# Patient Record
Sex: MALE | Race: ASIAN | NOT HISPANIC OR LATINO | URBAN - METROPOLITAN AREA
[De-identification: names, ages, dates, MRNs, and addresses within clinical notes are randomized per-mention and may not be internally consistent; named-entity substitution may affect disease eponyms.]

---

## 2024-03-22 ENCOUNTER — INPATIENT (INPATIENT)
Facility: HOSPITAL | Age: 59
LOS: 4 days | Discharge: HOME HEALTH | End: 2024-03-27
Attending: HOSPITALIST | Admitting: HOSPITALIST
Payer: MEDICAID

## 2024-03-22 VITALS — RESPIRATION RATE: 135 BRPM | OXYGEN SATURATION: 73 % | HEART RATE: 135 BPM

## 2024-03-22 LAB
ALBUMIN SERPL ELPH-MCNC: 3.8 G/DL — SIGNIFICANT CHANGE UP (ref 3.3–5)
ALP SERPL-CCNC: 83 U/L — SIGNIFICANT CHANGE UP (ref 40–120)
ALT FLD-CCNC: 21 U/L — SIGNIFICANT CHANGE UP (ref 4–41)
ANION GAP SERPL CALC-SCNC: 13 MMOL/L — SIGNIFICANT CHANGE UP (ref 7–14)
APTT BLD: 34.7 SEC — SIGNIFICANT CHANGE UP (ref 24.5–35.6)
AST SERPL-CCNC: 26 U/L — SIGNIFICANT CHANGE UP (ref 4–40)
BASE EXCESS BLDV CALC-SCNC: 3.7 MMOL/L — HIGH (ref -2–3)
BASE EXCESS BLDV CALC-SCNC: 4.6 MMOL/L — HIGH (ref -2–3)
BASOPHILS # BLD AUTO: 0.03 K/UL — SIGNIFICANT CHANGE UP (ref 0–0.2)
BASOPHILS NFR BLD AUTO: 0.4 % — SIGNIFICANT CHANGE UP (ref 0–2)
BILIRUB SERPL-MCNC: 0.4 MG/DL — SIGNIFICANT CHANGE UP (ref 0.2–1.2)
BLOOD GAS VENOUS COMPREHENSIVE RESULT: SIGNIFICANT CHANGE UP
BLOOD GAS VENOUS COMPREHENSIVE RESULT: SIGNIFICANT CHANGE UP
BUN SERPL-MCNC: 5 MG/DL — LOW (ref 7–23)
CALCIUM SERPL-MCNC: 9.8 MG/DL — SIGNIFICANT CHANGE UP (ref 8.4–10.5)
CHLORIDE BLDV-SCNC: 100 MMOL/L — SIGNIFICANT CHANGE UP (ref 96–108)
CHLORIDE BLDV-SCNC: 99 MMOL/L — SIGNIFICANT CHANGE UP (ref 96–108)
CHLORIDE SERPL-SCNC: 100 MMOL/L — SIGNIFICANT CHANGE UP (ref 98–107)
CO2 BLDV-SCNC: 31.9 MMOL/L — HIGH (ref 22–26)
CO2 BLDV-SCNC: 33.4 MMOL/L — HIGH (ref 22–26)
CO2 SERPL-SCNC: 27 MMOL/L — SIGNIFICANT CHANGE UP (ref 22–31)
CREAT SERPL-MCNC: 0.77 MG/DL — SIGNIFICANT CHANGE UP (ref 0.5–1.3)
EGFR: 104 ML/MIN/1.73M2 — SIGNIFICANT CHANGE UP
EOSINOPHIL # BLD AUTO: 0.46 K/UL — SIGNIFICANT CHANGE UP (ref 0–0.5)
EOSINOPHIL NFR BLD AUTO: 6.6 % — HIGH (ref 0–6)
FLUAV AG NPH QL: SIGNIFICANT CHANGE UP
FLUBV AG NPH QL: SIGNIFICANT CHANGE UP
GAS PNL BLDV: 134 MMOL/L — LOW (ref 136–145)
GAS PNL BLDV: 135 MMOL/L — LOW (ref 136–145)
GLUCOSE BLDV-MCNC: 106 MG/DL — HIGH (ref 70–99)
GLUCOSE BLDV-MCNC: 173 MG/DL — HIGH (ref 70–99)
GLUCOSE SERPL-MCNC: 106 MG/DL — HIGH (ref 70–99)
HCO3 BLDV-SCNC: 30 MMOL/L — HIGH (ref 22–29)
HCO3 BLDV-SCNC: 32 MMOL/L — HIGH (ref 22–29)
HCT VFR BLD CALC: 44 % — SIGNIFICANT CHANGE UP (ref 39–50)
HCT VFR BLDA CALC: 40 % — SIGNIFICANT CHANGE UP (ref 39–51)
HCT VFR BLDA CALC: 46 % — SIGNIFICANT CHANGE UP (ref 39–51)
HGB BLD CALC-MCNC: 13.3 G/DL — SIGNIFICANT CHANGE UP (ref 12.6–17.4)
HGB BLD CALC-MCNC: 15.3 G/DL — SIGNIFICANT CHANGE UP (ref 12.6–17.4)
HGB BLD-MCNC: 14.3 G/DL — SIGNIFICANT CHANGE UP (ref 13–17)
IANC: 2.72 K/UL — SIGNIFICANT CHANGE UP (ref 1.8–7.4)
IMM GRANULOCYTES NFR BLD AUTO: 0.1 % — SIGNIFICANT CHANGE UP (ref 0–0.9)
INR BLD: 1.16 RATIO — SIGNIFICANT CHANGE UP (ref 0.85–1.18)
LACTATE BLDV-MCNC: 1.6 MMOL/L — SIGNIFICANT CHANGE UP (ref 0.5–2)
LACTATE BLDV-MCNC: 2.7 MMOL/L — HIGH (ref 0.5–2)
LYMPHOCYTES # BLD AUTO: 2.66 K/UL — SIGNIFICANT CHANGE UP (ref 1–3.3)
LYMPHOCYTES # BLD AUTO: 38.3 % — SIGNIFICANT CHANGE UP (ref 13–44)
MCHC RBC-ENTMCNC: 28.9 PG — SIGNIFICANT CHANGE UP (ref 27–34)
MCHC RBC-ENTMCNC: 32.5 GM/DL — SIGNIFICANT CHANGE UP (ref 32–36)
MCV RBC AUTO: 89.1 FL — SIGNIFICANT CHANGE UP (ref 80–100)
MONOCYTES # BLD AUTO: 1.06 K/UL — HIGH (ref 0–0.9)
MONOCYTES NFR BLD AUTO: 15.3 % — HIGH (ref 2–14)
NEUTROPHILS # BLD AUTO: 2.72 K/UL — SIGNIFICANT CHANGE UP (ref 1.8–7.4)
NEUTROPHILS NFR BLD AUTO: 39.3 % — LOW (ref 43–77)
NRBC # BLD: 0 /100 WBCS — SIGNIFICANT CHANGE UP (ref 0–0)
NRBC # FLD: 0 K/UL — SIGNIFICANT CHANGE UP (ref 0–0)
NT-PROBNP SERPL-SCNC: 42 PG/ML — SIGNIFICANT CHANGE UP
PCO2 BLDV: 49 MMHG — SIGNIFICANT CHANGE UP (ref 42–55)
PCO2 BLDV: 60 MMHG — HIGH (ref 42–55)
PH BLDV: 7.33 — SIGNIFICANT CHANGE UP (ref 7.32–7.43)
PH BLDV: 7.4 — SIGNIFICANT CHANGE UP (ref 7.32–7.43)
PLATELET # BLD AUTO: 295 K/UL — SIGNIFICANT CHANGE UP (ref 150–400)
PO2 BLDV: 107 MMHG — HIGH (ref 25–45)
PO2 BLDV: 35 MMHG — SIGNIFICANT CHANGE UP (ref 25–45)
POTASSIUM BLDV-SCNC: 3.4 MMOL/L — LOW (ref 3.5–5.1)
POTASSIUM BLDV-SCNC: 4.4 MMOL/L — SIGNIFICANT CHANGE UP (ref 3.5–5.1)
POTASSIUM SERPL-MCNC: 3.5 MMOL/L — SIGNIFICANT CHANGE UP (ref 3.5–5.3)
POTASSIUM SERPL-SCNC: 3.5 MMOL/L — SIGNIFICANT CHANGE UP (ref 3.5–5.3)
PROT SERPL-MCNC: 8.2 G/DL — SIGNIFICANT CHANGE UP (ref 6–8.3)
PROTHROM AB SERPL-ACNC: 12.9 SEC — SIGNIFICANT CHANGE UP (ref 9.5–13)
RBC # BLD: 4.94 M/UL — SIGNIFICANT CHANGE UP (ref 4.2–5.8)
RBC # FLD: 13.2 % — SIGNIFICANT CHANGE UP (ref 10.3–14.5)
RSV RNA NPH QL NAA+NON-PROBE: SIGNIFICANT CHANGE UP
SAO2 % BLDV: 50.3 % — LOW (ref 67–88)
SAO2 % BLDV: 96.9 % — HIGH (ref 67–88)
SARS-COV-2 RNA SPEC QL NAA+PROBE: SIGNIFICANT CHANGE UP
SODIUM SERPL-SCNC: 140 MMOL/L — SIGNIFICANT CHANGE UP (ref 135–145)
TROPONIN T, HIGH SENSITIVITY RESULT: 17 NG/L — SIGNIFICANT CHANGE UP
WBC # BLD: 6.94 K/UL — SIGNIFICANT CHANGE UP (ref 3.8–10.5)
WBC # FLD AUTO: 6.94 K/UL — SIGNIFICANT CHANGE UP (ref 3.8–10.5)

## 2024-03-22 PROCEDURE — 99285 EMERGENCY DEPT VISIT HI MDM: CPT

## 2024-03-22 RX ORDER — IPRATROPIUM/ALBUTEROL SULFATE 18-103MCG
3 AEROSOL WITH ADAPTER (GRAM) INHALATION
Refills: 0 | Status: COMPLETED | OUTPATIENT
Start: 2024-03-22 | End: 2024-03-22

## 2024-03-22 RX ORDER — ACETAMINOPHEN 500 MG
1000 TABLET ORAL ONCE
Refills: 0 | Status: COMPLETED | OUTPATIENT
Start: 2024-03-22 | End: 2024-03-22

## 2024-03-22 RX ORDER — AZITHROMYCIN 500 MG/1
500 TABLET, FILM COATED ORAL ONCE
Refills: 0 | Status: COMPLETED | OUTPATIENT
Start: 2024-03-22 | End: 2024-03-22

## 2024-03-22 RX ORDER — CEFTRIAXONE 500 MG/1
1000 INJECTION, POWDER, FOR SOLUTION INTRAMUSCULAR; INTRAVENOUS ONCE
Refills: 0 | Status: COMPLETED | OUTPATIENT
Start: 2024-03-22 | End: 2024-03-22

## 2024-03-22 RX ADMIN — Medication 125 MILLIGRAM(S): at 22:32

## 2024-03-22 RX ADMIN — AZITHROMYCIN 255 MILLIGRAM(S): 500 TABLET, FILM COATED ORAL at 23:24

## 2024-03-22 RX ADMIN — Medication 3 MILLILITER(S): at 22:52

## 2024-03-22 RX ADMIN — Medication 3 MILLILITER(S): at 23:09

## 2024-03-22 RX ADMIN — Medication 3 MILLILITER(S): at 22:33

## 2024-03-22 RX ADMIN — Medication 400 MILLIGRAM(S): at 22:32

## 2024-03-22 NOTE — ED ADULT NURSE REASSESSMENT NOTE - NS ED NURSE REASSESS COMMENT FT1
report received from breezy Nguyen and AYAH Rizo. pt remains at baseline mental status, remains on BIPAP tolerating well, o2 saturation 100%. pt sitting in stretcher comfortably at this time, no new complaints offered. pt well appearing. b/l 20g IVs in place, no signs of infiltration noted. stretcher lowest position siderails up safety measures in place. patient was critically ill... Patient was critically ill with a high probability of imminent or life threatening deterioration.

## 2024-03-22 NOTE — ED PROVIDER NOTE - NS ED ROS FT
see attending attestation Mauc Instructions: By selecting yes to the question below the MAUC number will be added into the note.  This will be calculated automatically based on the diagnosis chosen, the size entered, the body zone selected (H,M,L) and the specific indications you chose. You will also have the option to override the Mohs AUC if you disagree with the automatically calculated number and this option is found in the Case Summary tab.

## 2024-03-22 NOTE — ED ADULT TRIAGE NOTE - CHIEF COMPLAINT QUOTE
Pt c/o chest pain and SOB, +use of accessory muscles. Spo2 73% on room air, placed on non-rebreather with improvement to 88%. Charge RN made aware pt directed TRB

## 2024-03-22 NOTE — CONSULT NOTE ADULT - SUBJECTIVE AND OBJECTIVE BOX
History: 58-year-old male, past medical history of emphysema/COPD and hyperlipidemia, presents to ED complaining of acute on chronic, worsening shortness of breath associated with chest pain x 2 days.  Patient has been dealing with his shortness of breath for some time now, was supposed to follow-up with a cardiologist, but was not able to.  Per EMS patient was noted to be hypoxic to 70s on room air.  Per family patient has also had intermittent low-grade temperature for 2 weeks now.  Denies any recent travel/sick contacts.  Denies headache, vision changes, abdominal pain, nausea/vomiting/diarrhea, urinary symptoms, lightheadedness/dizziness, weakness/numbness, rashes or any other symptoms at this time.    Vital signs significant for tachycardia, tachypnea, low-grade temp, and hypoxia.  EKG significant for sinus tachycardia without ischemic changes.  Physical exam significant for ill-appearing male in moderate respiratory distress.  Head is normocephalic is atraumatic.  Extraocular was intact.  Pupils equal round and reactive to light.  Oropharynx clear.  Heart is tachycardic without murmur.  Lungs significant for diffuse expiratory wheezing, poor air movement, bibasilar crackles.  No stridor.  Patient utilizing accessory muscles and belly breathing.  Abdomen is soft, mildly distended, nontender.  Patient with 1+ pitting bilateral lower extremity edema.  Pulses are 2+ throughout.  Skin is warm and well-perfused without rashes.  Neuroexam is nonfocal.  Bedside POCUS performed with bilateral B-lines present.    MICU consulted for hypoxemic respiratory failure requiring noninvasive ventilation.    PAST MEDICAL & SURGICAL HISTORY:  COPD exacerbation      HLD (hyperlipidemia)          FAMILY HISTORY:      Advance Directives:    Allergies    No Known Allergies    Intolerances          OBJECTIVE:  ICU Vital Signs Last 24 Hrs  T(C): 37.8 (22 Mar 2024 22:30), Max: 37.8 (22 Mar 2024 22:30)  T(F): 100 (22 Mar 2024 22:30), Max: 100 (22 Mar 2024 22:30)  HR: 110 (22 Mar 2024 22:30) (110 - 135)  BP: 127/79 (22 Mar 2024 22:30) (125/95 - 127/79)  BP(mean): --  ABP: --  ABP(mean): --  RR: 30 (22 Mar 2024 22:30) (30 - 35)  SpO2: 100% (22 Mar 2024 22:30) (73% - 100%)    O2 Parameters below as of 22 Mar 2024 22:30  Patient On (Oxygen Delivery Method): BiPAP/CPAP              CAPILLARY BLOOD GLUCOSE          PHYSICAL EXAM:     GENERAL: NAD, lying in bed comfortably  HEAD:  Atraumatic, Normocephalic  EYES: EOMI, PERRLA, conjunctiva and sclera clear  ENT: Moist mucous membranes  NECK: Supple, No JVD  CHEST/LUNG: Tachypneic with expiratory wheezes  HEART: Tachycardic  ABDOMEN: normal bowel sounds; Soft, nontender, nondistended  EXTREMITIES:  2+ Peripheral Pulses, brisk capillary refill. No clubbing, cyanosis, or edema  Neurological:  A&Ox3, no focal deficits   SKIN: No rashes or lesions  PSYCH: normal affect and mood       HOSPITAL MEDICATIONS:  MEDICATIONS  (STANDING):  cefTRIAXone   IVPB 1000 milliGRAM(s) IV Intermittent once    MEDICATIONS  (PRN):      LABS:                        14.3   6.94  )-----------( 295      ( 22 Mar 2024 22:20 )             44.0     03-22    140  |  100  |  5<L>  ----------------------------<  106<H>  3.5   |  27  |  0.77    Ca    9.8      22 Mar 2024 22:20    TPro  8.2  /  Alb  3.8  /  TBili  0.4  /  DBili  x   /  AST  26  /  ALT  21  /  AlkPhos  83  03-22    PT/INR - ( 22 Mar 2024 22:20 )   PT: 12.9 sec;   INR: 1.16 ratio         PTT - ( 22 Mar 2024 22:20 )  PTT:34.7 sec  Urinalysis Basic - ( 22 Mar 2024 22:20 )    Color: x / Appearance: x / SG: x / pH: x  Gluc: 106 mg/dL / Ketone: x  / Bili: x / Urobili: x   Blood: x / Protein: x / Nitrite: x   Leuk Esterase: x / RBC: x / WBC x   Sq Epi: x / Non Sq Epi: x / Bacteria: x        Venous Blood Gas:  03-22 @ 23:30  7.40/49/107/30/96.9  VBG Lactate: 1.6  Venous Blood Gas:  03-22 @ 22:20  7.33/60/35/32/50.3  VBG Lactate: 2.7

## 2024-03-22 NOTE — ED PROVIDER NOTE - PROGRESS NOTE DETAILS
Yann, PGY3 - spoke to MICU for new BIPAP, will come see patient. Pt signed out to me pending admission to floor for sepsis vs COPD exacerbation. Not a MICU candidate at this time. Blood cultures drawn prior to abx, ordered now. Johnie Fournier, ED Attending

## 2024-03-22 NOTE — ED ADULT NURSE NOTE - NSFALLUNIVINTERV_ED_ALL_ED
Bed/Stretcher in lowest position, wheels locked, appropriate side rails in place/Call bell, personal items and telephone in reach/Instruct patient to call for assistance before getting out of bed/chair/stretcher/Non-slip footwear applied when patient is off stretcher/[place] to call system/Physically safe environment - no spills, clutter or unnecessary equipment/Purposeful proactive rounding/Room/bathroom lighting operational, light cord in reach

## 2024-03-22 NOTE — CONSULT NOTE ADULT - ASSESSMENT
58-year-old male, past medical history of emphysema/COPD and hyperlipidemia, presents to ED complaining of acute on chronic, worsening shortness of breath with hypoxemic respiratory failure requiring noninvasive ventilation.    #Hypoxemic respiratory failure  #Emphysema  Patient with acute hypoxemic respiratory failure reportedly to 70s requiring noninvasive ventilation. Family at bedside with recent pulmonology visit on 3/20/24 with suspicion of COPD exacebr 58-year-old male, past medical history of emphysema/COPD and hyperlipidemia, presents to ED complaining of acute on chronic, worsening shortness of breath with hypoxemic respiratory failure requiring noninvasive ventilation.    #Hypoxemic respiratory failure  #Emphysema  Patient with acute hypoxemic respiratory failure reportedly to 70s requiring noninvasive ventilation. Family at bedside with recent pulmonology visit on 3/20/24 with suspicion of COPD exacerbation for which he was prescribed a course of prednisone as well as fluoroquinolone. On physical exam, besides his tachypnea he appears rather euvolemic with diffuse expiratory wheezes with bedside POCUS limited but showing a few scattered b-lines but relatively normal aeration pattern as well as RV<LV. His biomarkers also do not suggest a cardiac etiology with EKG showing sinus tach with no evident ischemic changes. Overall, his tachypnea with acute hypoxemic respiratory failure seem to be consistent with a COPD exacerbation  - F/u CXR as well as RVP  - Reasonable to continue empiric antibiotics with ceftriaxone and azithromycin pending further diagnostics for bacterial PNA  - Can continue home inhalers (breztri)  - Would start standing duonebs q6  - Trend blood gas  - Continue Bilevel at this time with   - Prednisone 40 q24    Not a MICU candidate at this time 58-year-old male, past medical history of emphysema/COPD and hyperlipidemia, presents to ED complaining of acute on chronic, worsening shortness of breath with hypoxemic respiratory failure requiring noninvasive ventilation.    #Hypoxemic respiratory failure  #Emphysema  Patient with acute hypoxemic respiratory failure reportedly to 70s requiring noninvasive ventilation. Family at bedside with recent pulmonology visit on 3/20/24 with suspicion of COPD exacerbation for which he was prescribed a course of prednisone as well as fluoroquinolone. On physical exam, besides his tachypnea he appears rather euvolemic with diffuse expiratory wheezes with bedside POCUS limited but showing a few scattered b-lines but relatively normal aeration pattern as well as RV<LV. His biomarkers also do not suggest a cardiac etiology with EKG showing sinus tach with no evident ischemic changes. Overall, his tachypnea with acute hypoxemic respiratory failure seem to be consistent with a COPD exacerbation  - F/u CXR as well as RVP  - Reasonable to continue empiric antibiotics with ceftriaxone and azithromycin pending further diagnostics for bacterial PNA  - Would start standing duonebs q6, symbicort bid  - Trend blood gas  - Continue Bilevel at this time with   - Prednisone 40 q24    Not a MICU candidate at this time 58-year-old male, past medical history of emphysema/COPD and hyperlipidemia, presents to ED complaining of acute on chronic, worsening shortness of breath with hypoxemic respiratory failure requiring noninvasive ventilation.    #Hypoxemic respiratory failure  #Emphysema  Patient with acute hypoxemic respiratory failure reportedly to 70s requiring noninvasive ventilation. Family at bedside with recent pulmonology visit on 3/20/24 with suspicion of COPD exacerbation for which he was prescribed a course of prednisone as well as fluoroquinolone. On physical exam, besides his tachypnea he appears rather euvolemic with diffuse expiratory wheezes with bedside POCUS limited but showing a few scattered b-lines but relatively normal aeration pattern as well as RV<LV. His biomarkers also do not suggest a cardiac etiology with EKG showing sinus tach with no evident ischemic changes. Overall, his tachypnea with acute hypoxemic respiratory failure seem to be consistent with a COPD exacerbation  - F/u CXR as well as RVP  - Reasonable to continue empiric antibiotics with ceftriaxone and azithromycin pending further diagnostics for bacterial PNA  - Would start standing duonebs q6, symbicort bid  - Trend blood gas  - Continue Bilevel at this time with   - Prednisone 40 q24    Not a MICU candidate at this time    Attending Attestation:    Evelyn Peterson is a 58 year old male w/ emphysema/COPD and hyperlipidemia who was brought to the ED by his son and daughter in law for progressive shortness of breath and fevers for several weeks.  They stated that patient has previously been told he has emphysema and has nebulizers at home.  He used to smoke but quit 30-40 years ago.  He was also recently given steroids by one of his doctors Patient was noted to be in respiratory distress per ED and placed on BiPAP and MICU was consulted for further evaluation.    On exam patient is tachypneic but achieving adequate tidal volumes on BiPAP 10/5, FiO2 30%.  He has bilateral wheezing on exam.  Overall his presentation is most consistent with acute exacerbation of COPD/emphysema.  He is currently not an ICU candidate    Recommendations:  -duonebs q 6 hours, symbicort 160/4.5 BID  -solumedrol 50 mg IV daily, with plan for 5 days total of steroids (can transition to prednisone 40 mg daily when tolerating PO intake)  - ceftriaxone/azithromycin for CAP   - send sputum culture, if patient able to provide  - TTE  - if patient appears comfortable with normal mental status can takes breaks off BiPAP, would check another VBG in the AM    MICU to remain available for further evaluation, please re-consult as necessary    Daniele Lazar PGY6  [id number]

## 2024-03-22 NOTE — CONSULT NOTE ADULT - ATTENDING COMMENTS
Evelyn Peterson is a 58 year old male w/ emphysema/COPD and hyperlipidemia who was brought to the ED by his son and daughter in law for progressive shortness of breath and fevers for several weeks.  They stated that patient has previously been told he has emphysema and has nebulizers at home.  He used to smoke but quit 30-40 years ago.  He was also recently given steroids by one of his doctors Patient was noted to be in respiratory distress per ED and placed on BiPAP and MICU was consulted for further evaluation.    On exam patient is tachypneic but achieving adequate tidal volumes on BiPAP 10/5, FiO2 30%.  He has bilateral wheezing on exam.  Overall his presentation is most consistent with acute exacerbation of COPD/emphysema.  He is currently not an ICU candidate    Recommendations:  -duonebs q 6 hours, symbicort 160/4.5 BID  -solumedrol 50 mg IV daily, with plan for 5 days total of steroids (can transition to prednisone 40 mg daily when tolerating PO intake)  - ceftriaxone/azithromycin for CAP   - send sputum culture, if patient able to provide  - TTE  - if patient appears comfortable with normal mental status can takes breaks off BiPAP, would check another VBG in the AM

## 2024-03-22 NOTE — ED PROVIDER NOTE - ATTENDING CONTRIBUTION TO CARE
58-year-old male, past medical history of emphysema/COPD and hyperlipidemia, presents to ED complaining of acute on chronic, worsening shortness of breath associated with chest pain x 2 days.  Patient has been dealing with his shortness of breath for some time now, was supposed to follow-up with a cardiologist, but was not able to.  Per EMS patient was noted to be hypoxic to 70s on room air.  Per family patient has also had intermittent low-grade temperature for 2 weeks now.  Denies any recent travel/sick contacts.  Denies headache, vision changes, abdominal pain, nausea/vomiting/diarrhea, urinary symptoms, lightheadedness/dizziness, weakness/numbness, rashes or any other symptoms at this time.    Vital signs significant for tachycardia, tachypnea, low-grade temp, and hypoxia.  EKG significant for sinus tachycardia without ischemic changes.  Physical exam significant for ill-appearing male in moderate respiratory distress.  Head is normocephalic is atraumatic.  Extraocular was intact.  Pupils equal round and reactive to light.  Oropharynx clear.  Heart is tachycardic without murmur.  Lungs significant for diffuse expiratory wheezing, poor air movement, bibasilar crackles.  No stridor.  Patient utilizing accessory muscles and belly breathing.  Abdomen is soft, mildly distended, nontender.  Patient with 1+ pitting bilateral lower extremity edema.  Pulses are 2+ throughout.  Skin is warm and well-perfused without rashes.  Neuroexam is nonfocal.  Bedside POCUS performed with bilateral B-lines present.    Present diagnosis includes but not limited to ACS versus CHF versus COPD exacerbation versus pneumonia.  Plan to check basic labs/electrolytes, VBG, troponin, BNP, flu/COVID swab, and chest x-ray.  Will place patient on BiPAP for increased work of breathing.  Will start IV azithromycin/Tylenol/methylprednisone give DuoNebs for symptom relief.  Patient will require MICU consult given new BiPAP.  Patient likely will require medical admission.

## 2024-03-22 NOTE — ED ADULT NURSE REASSESSMENT NOTE - NS ED NURSE REASSESS COMMENT FT1
Alek RN: Pt is A&Ox4. Pt tachypneic and tachycardic. Pt febrile rectally 100F; MD made aware. Labs collected and sent. Pt placed on BiPAP. Pt tolerating well at this time. Chest rise equal b/l. VS as noted in flow sheets. Pt denies chest pain, abdominal pain, N/V/D, h/a, dizziness, numbness/tingling or any urinary symptoms at this time. Medications administered as ordered, see MAR. Safety maintained throughout. Report given to Primary RN Stephanie.

## 2024-03-23 DIAGNOSIS — J96.02 ACUTE RESPIRATORY FAILURE WITH HYPERCAPNIA: ICD-10-CM

## 2024-03-23 DIAGNOSIS — A41.9 SEPSIS, UNSPECIFIED ORGANISM: ICD-10-CM

## 2024-03-23 DIAGNOSIS — Z87.09 PERSONAL HISTORY OF OTHER DISEASES OF THE RESPIRATORY SYSTEM: ICD-10-CM

## 2024-03-23 DIAGNOSIS — J96.01 ACUTE RESPIRATORY FAILURE WITH HYPOXIA: ICD-10-CM

## 2024-03-23 DIAGNOSIS — E78.5 HYPERLIPIDEMIA, UNSPECIFIED: ICD-10-CM

## 2024-03-23 DIAGNOSIS — R06.02 SHORTNESS OF BREATH: ICD-10-CM

## 2024-03-23 DIAGNOSIS — J44.1 CHRONIC OBSTRUCTIVE PULMONARY DISEASE WITH (ACUTE) EXACERBATION: ICD-10-CM

## 2024-03-23 DIAGNOSIS — Z29.9 ENCOUNTER FOR PROPHYLACTIC MEASURES, UNSPECIFIED: ICD-10-CM

## 2024-03-23 LAB
ADD ON TEST-SPECIMEN IN LAB: SIGNIFICANT CHANGE UP
ADD ON TEST-SPECIMEN IN LAB: SIGNIFICANT CHANGE UP
ALBUMIN SERPL ELPH-MCNC: 3.3 G/DL — SIGNIFICANT CHANGE UP (ref 3.3–5)
ALP SERPL-CCNC: 71 U/L — SIGNIFICANT CHANGE UP (ref 40–120)
ALT FLD-CCNC: 24 U/L — SIGNIFICANT CHANGE UP (ref 4–41)
ANION GAP SERPL CALC-SCNC: 16 MMOL/L — HIGH (ref 7–14)
AST SERPL-CCNC: 23 U/L — SIGNIFICANT CHANGE UP (ref 4–40)
BASOPHILS # BLD AUTO: 0 K/UL — SIGNIFICANT CHANGE UP (ref 0–0.2)
BASOPHILS NFR BLD AUTO: 0 % — SIGNIFICANT CHANGE UP (ref 0–2)
BILIRUB SERPL-MCNC: 0.3 MG/DL — SIGNIFICANT CHANGE UP (ref 0.2–1.2)
BUN SERPL-MCNC: 8 MG/DL — SIGNIFICANT CHANGE UP (ref 7–23)
CALCIUM SERPL-MCNC: 9.2 MG/DL — SIGNIFICANT CHANGE UP (ref 8.4–10.5)
CHLORIDE SERPL-SCNC: 96 MMOL/L — LOW (ref 98–107)
CO2 SERPL-SCNC: 23 MMOL/L — SIGNIFICANT CHANGE UP (ref 22–31)
CREAT SERPL-MCNC: 0.68 MG/DL — SIGNIFICANT CHANGE UP (ref 0.5–1.3)
EGFR: 108 ML/MIN/1.73M2 — SIGNIFICANT CHANGE UP
EOSINOPHIL # BLD AUTO: 0 K/UL — SIGNIFICANT CHANGE UP (ref 0–0.5)
EOSINOPHIL NFR BLD AUTO: 0 % — SIGNIFICANT CHANGE UP (ref 0–6)
GLUCOSE SERPL-MCNC: 193 MG/DL — HIGH (ref 70–99)
HCT VFR BLD CALC: 40.2 % — SIGNIFICANT CHANGE UP (ref 39–50)
HGB BLD-MCNC: 13 G/DL — SIGNIFICANT CHANGE UP (ref 13–17)
IANC: 2.32 K/UL — SIGNIFICANT CHANGE UP (ref 1.8–7.4)
IMM GRANULOCYTES NFR BLD AUTO: 0.3 % — SIGNIFICANT CHANGE UP (ref 0–0.9)
LYMPHOCYTES # BLD AUTO: 0.99 K/UL — LOW (ref 1–3.3)
LYMPHOCYTES # BLD AUTO: 29.3 % — SIGNIFICANT CHANGE UP (ref 13–44)
MAGNESIUM SERPL-MCNC: 1.9 MG/DL — SIGNIFICANT CHANGE UP (ref 1.6–2.6)
MCHC RBC-ENTMCNC: 28.4 PG — SIGNIFICANT CHANGE UP (ref 27–34)
MCHC RBC-ENTMCNC: 32.3 GM/DL — SIGNIFICANT CHANGE UP (ref 32–36)
MCV RBC AUTO: 88 FL — SIGNIFICANT CHANGE UP (ref 80–100)
MONOCYTES # BLD AUTO: 0.06 K/UL — SIGNIFICANT CHANGE UP (ref 0–0.9)
MONOCYTES NFR BLD AUTO: 1.8 % — LOW (ref 2–14)
NEUTROPHILS # BLD AUTO: 2.32 K/UL — SIGNIFICANT CHANGE UP (ref 1.8–7.4)
NEUTROPHILS NFR BLD AUTO: 68.6 % — SIGNIFICANT CHANGE UP (ref 43–77)
NRBC # BLD: 0 /100 WBCS — SIGNIFICANT CHANGE UP (ref 0–0)
NRBC # FLD: 0 K/UL — SIGNIFICANT CHANGE UP (ref 0–0)
PHOSPHATE SERPL-MCNC: 3.9 MG/DL — SIGNIFICANT CHANGE UP (ref 2.5–4.5)
PLATELET # BLD AUTO: 276 K/UL — SIGNIFICANT CHANGE UP (ref 150–400)
POTASSIUM SERPL-MCNC: 4.2 MMOL/L — SIGNIFICANT CHANGE UP (ref 3.5–5.3)
POTASSIUM SERPL-SCNC: 4.2 MMOL/L — SIGNIFICANT CHANGE UP (ref 3.5–5.3)
PROT SERPL-MCNC: 7.7 G/DL — SIGNIFICANT CHANGE UP (ref 6–8.3)
RBC # BLD: 4.57 M/UL — SIGNIFICANT CHANGE UP (ref 4.2–5.8)
RBC # FLD: 13 % — SIGNIFICANT CHANGE UP (ref 10.3–14.5)
SODIUM SERPL-SCNC: 135 MMOL/L — SIGNIFICANT CHANGE UP (ref 135–145)
TROPONIN T, HIGH SENSITIVITY RESULT: 19 NG/L — SIGNIFICANT CHANGE UP
WBC # BLD: 3.38 K/UL — LOW (ref 3.8–10.5)
WBC # FLD AUTO: 3.38 K/UL — LOW (ref 3.8–10.5)

## 2024-03-23 PROCEDURE — 99223 1ST HOSP IP/OBS HIGH 75: CPT | Mod: GC

## 2024-03-23 PROCEDURE — 74019 RADEX ABDOMEN 2 VIEWS: CPT | Mod: 26

## 2024-03-23 PROCEDURE — 71045 X-RAY EXAM CHEST 1 VIEW: CPT | Mod: 26

## 2024-03-23 PROCEDURE — 99418 PROLNG IP/OBS E/M EA 15 MIN: CPT | Mod: GC

## 2024-03-23 PROCEDURE — 99222 1ST HOSP IP/OBS MODERATE 55: CPT | Mod: GC

## 2024-03-23 RX ORDER — HYDROCORTISONE 20 MG
60 TABLET ORAL EVERY 8 HOURS
Refills: 0 | Status: DISCONTINUED | OUTPATIENT
Start: 2024-03-23 | End: 2024-03-23

## 2024-03-23 RX ORDER — MONTELUKAST 4 MG/1
10 TABLET, CHEWABLE ORAL DAILY
Refills: 0 | Status: DISCONTINUED | OUTPATIENT
Start: 2024-03-23 | End: 2024-03-27

## 2024-03-23 RX ORDER — FAMOTIDINE 10 MG/ML
1 INJECTION INTRAVENOUS
Refills: 0 | DISCHARGE

## 2024-03-23 RX ORDER — ONDANSETRON 8 MG/1
4 TABLET, FILM COATED ORAL EVERY 8 HOURS
Refills: 0 | Status: DISCONTINUED | OUTPATIENT
Start: 2024-03-23 | End: 2024-03-27

## 2024-03-23 RX ORDER — ATORVASTATIN CALCIUM 80 MG/1
10 TABLET, FILM COATED ORAL AT BEDTIME
Refills: 0 | Status: DISCONTINUED | OUTPATIENT
Start: 2024-03-23 | End: 2024-03-27

## 2024-03-23 RX ORDER — ENOXAPARIN SODIUM 100 MG/ML
40 INJECTION SUBCUTANEOUS EVERY 24 HOURS
Refills: 0 | Status: DISCONTINUED | OUTPATIENT
Start: 2024-03-23 | End: 2024-03-27

## 2024-03-23 RX ORDER — AZITHROMYCIN 500 MG/1
500 TABLET, FILM COATED ORAL EVERY 24 HOURS
Refills: 0 | Status: DISCONTINUED | OUTPATIENT
Start: 2024-03-23 | End: 2024-03-27

## 2024-03-23 RX ORDER — IPRATROPIUM/ALBUTEROL SULFATE 18-103MCG
3 AEROSOL WITH ADAPTER (GRAM) INHALATION EVERY 4 HOURS
Refills: 0 | Status: DISCONTINUED | OUTPATIENT
Start: 2024-03-23 | End: 2024-03-27

## 2024-03-23 RX ORDER — MONTELUKAST 4 MG/1
1 TABLET, CHEWABLE ORAL
Refills: 0 | DISCHARGE

## 2024-03-23 RX ORDER — FAMOTIDINE 10 MG/ML
40 INJECTION INTRAVENOUS DAILY
Refills: 0 | Status: DISCONTINUED | OUTPATIENT
Start: 2024-03-23 | End: 2024-03-27

## 2024-03-23 RX ORDER — TIOTROPIUM BROMIDE 18 UG/1
2 CAPSULE ORAL; RESPIRATORY (INHALATION)
Refills: 0 | DISCHARGE

## 2024-03-23 RX ORDER — BUDESONIDE AND FORMOTEROL FUMARATE DIHYDRATE 160; 4.5 UG/1; UG/1
2 AEROSOL RESPIRATORY (INHALATION)
Refills: 0 | Status: DISCONTINUED | OUTPATIENT
Start: 2024-03-23 | End: 2024-03-27

## 2024-03-23 RX ORDER — POLYETHYLENE GLYCOL 3350 17 G/17G
17 POWDER, FOR SOLUTION ORAL DAILY
Refills: 0 | Status: DISCONTINUED | OUTPATIENT
Start: 2024-03-23 | End: 2024-03-27

## 2024-03-23 RX ORDER — CEFTRIAXONE 500 MG/1
1000 INJECTION, POWDER, FOR SOLUTION INTRAMUSCULAR; INTRAVENOUS EVERY 24 HOURS
Refills: 0 | Status: DISCONTINUED | OUTPATIENT
Start: 2024-03-23 | End: 2024-03-27

## 2024-03-23 RX ORDER — BUDESONIDE AND FORMOTEROL FUMARATE DIHYDRATE 160; 4.5 UG/1; UG/1
2 AEROSOL RESPIRATORY (INHALATION)
Refills: 0 | DISCHARGE

## 2024-03-23 RX ORDER — ATORVASTATIN CALCIUM 80 MG/1
1 TABLET, FILM COATED ORAL
Refills: 0 | DISCHARGE

## 2024-03-23 RX ORDER — ACETAMINOPHEN 500 MG
650 TABLET ORAL EVERY 6 HOURS
Refills: 0 | Status: DISCONTINUED | OUTPATIENT
Start: 2024-03-23 | End: 2024-03-27

## 2024-03-23 RX ORDER — LANOLIN ALCOHOL/MO/W.PET/CERES
3 CREAM (GRAM) TOPICAL AT BEDTIME
Refills: 0 | Status: DISCONTINUED | OUTPATIENT
Start: 2024-03-23 | End: 2024-03-27

## 2024-03-23 RX ORDER — SIMETHICONE 80 MG/1
80 TABLET, CHEWABLE ORAL ONCE
Refills: 0 | Status: COMPLETED | OUTPATIENT
Start: 2024-03-23 | End: 2024-03-23

## 2024-03-23 RX ADMIN — SIMETHICONE 80 MILLIGRAM(S): 80 TABLET, CHEWABLE ORAL at 13:14

## 2024-03-23 RX ADMIN — MONTELUKAST 10 MILLIGRAM(S): 4 TABLET, CHEWABLE ORAL at 10:33

## 2024-03-23 RX ADMIN — AZITHROMYCIN 255 MILLIGRAM(S): 500 TABLET, FILM COATED ORAL at 23:20

## 2024-03-23 RX ADMIN — Medication 3 MILLILITER(S): at 10:32

## 2024-03-23 RX ADMIN — BUDESONIDE AND FORMOTEROL FUMARATE DIHYDRATE 2 PUFF(S): 160; 4.5 AEROSOL RESPIRATORY (INHALATION) at 21:39

## 2024-03-23 RX ADMIN — Medication 60 MILLIGRAM(S): at 14:27

## 2024-03-23 RX ADMIN — Medication 60 MILLIGRAM(S): at 06:55

## 2024-03-23 RX ADMIN — BUDESONIDE AND FORMOTEROL FUMARATE DIHYDRATE 2 PUFF(S): 160; 4.5 AEROSOL RESPIRATORY (INHALATION) at 10:32

## 2024-03-23 RX ADMIN — ATORVASTATIN CALCIUM 10 MILLIGRAM(S): 80 TABLET, FILM COATED ORAL at 21:38

## 2024-03-23 RX ADMIN — ENOXAPARIN SODIUM 40 MILLIGRAM(S): 100 INJECTION SUBCUTANEOUS at 21:38

## 2024-03-23 RX ADMIN — FAMOTIDINE 40 MILLIGRAM(S): 10 INJECTION INTRAVENOUS at 10:33

## 2024-03-23 RX ADMIN — Medication 3 MILLILITER(S): at 17:09

## 2024-03-23 RX ADMIN — CEFTRIAXONE 100 MILLIGRAM(S): 500 INJECTION, POWDER, FOR SOLUTION INTRAMUSCULAR; INTRAVENOUS at 00:31

## 2024-03-23 RX ADMIN — Medication 3 MILLILITER(S): at 22:19

## 2024-03-23 RX ADMIN — Medication 3 MILLIGRAM(S): at 23:20

## 2024-03-23 NOTE — H&P ADULT - PROBLEM SELECTOR PLAN 1
Pt p/w worsening dyspnea, with reported O2 sat 70s on RA. Has a known hx of COPD, has npt Pt p/w worsening dyspnea, with reported O2 sat 70s on RA. Has a known hx of COPD, is not on home oxygen. VBG shows hypercarbia (60) with HCO3- of  32 on VBG, indicating chronic hypercapnia to an extent. Repeat VBG (looks like an ABG based on O2%) shows PCO2 down to 49 on BiPAP. C/f Acute Hypoxic Hypercapnic Respiratory Failure from COPD exacerbation: has dyspnea, increased sputum production and cough.  - c/f COPD exacerbation  - management: Antibiotics, Bronchodilators, Corticosteroids        - agree with coverage for CAP ( CTX + Azithro), 5d treatment        - bronchodilator: would do DuoNebs q4h for standing for now; Symbicort 160/4.5 2 puffs BID        - corticosteroids: PO non-inferior to IV-> would continue with Methylpred 60mg IV BID as long as NPO-> then switch to Prednisone 40mg qd x5 days  - on BiPAP 10/5 on FiO2 30%  - can trial off BiPAP now that gas is better  - get ABG in AM Pt p/w worsening dyspnea, with reported O2 sat 70s on RA. Has a known hx of COPD, is not on home oxygen. VBG shows hypercarbia (60) with HCO3- of  32 on VBG, indicating chronic hypercapnia to an extent. Repeat VBG (looks like an ABG based on O2%) shows PCO2 down to 49 on BiPAP. C/f Acute Hypoxic Hypercapnic Respiratory Failure from COPD exacerbation: has dyspnea, increased sputum production and cough.  - c/f COPD exacerbation  - management: Antibiotics, Bronchodilators, Corticosteroids        - agree with coverage for CAP ( CTX + Azithro), 5d treatment        - bronchodilator: would do DuoNebs q4h for standing for now; Symbicort 160/4.5 2 puffs BID        - corticosteroids: PO non-inferior to IV-> would continue with Solumedrol 60mg IV BID as long as NPO-> then switch to Prednisone 40mg qd x5 days  - on BiPAP 10/5 on FiO2 30%  - can trial off BiPAP now that gas is better  - get ABG in AM Pt p/w worsening dyspnea, with reported O2 sat 70s on RA. Has a known hx of COPD, is not on home oxygen. VBG shows hypercarbia (60) with HCO3- of  32 on VBG, indicating chronic hypercapnia to an extent. Repeat VBG (looks like an ABG based on O2%) shows PCO2 down to 49 on BiPAP. C/f Acute Hypoxic Hypercapnic Respiratory Failure from COPD exacerbation: has dyspnea, increased sputum production and cough.  - c/f COPD exacerbation  - management: Antibiotics, Bronchodilators, Corticosteroids        - agree with coverage for CAP ( CTX + Azithro), 5d treatment        - bronchodilator: would do DuoNebs q4h for standing for now; Symbicort 160/4.5 2 puffs BID        - corticosteroids: PO non-inferior to IV-> would continue with Solumedrol 60mg IV BID as long as NPO-> then switch to Prednisone 40mg qd x5 days  - on BiPAP 10/5 on FiO2 30%  - can trial off BiPAP now that gas is better-> VBG/ABG 1h post-removal  - get ABG in AM Pt p/w worsening dyspnea, with reported O2 sat 70s on RA. Has a known hx of COPD, is not on home oxygen. VBG shows hypercarbia (60) with HCO3- of  32 on VBG, indicating chronic hypercapnia to an extent. Repeat VBG (looks like an ABG based on O2%) shows PCO2 down to 49 on BiPAP. C/f Acute Hypoxic Hypercapnic Respiratory Failure from COPD exacerbation: has dyspnea, increased sputum production and cough.  - c/f COPD exacerbation  - management: Antibiotics, Bronchodilators, Corticosteroids        - agree with coverage for CAP ( CTX + Azithro), as started in the ED; for 5d treatment        - bronchodilator: would do DuoNebs q4h for standing for now; Symbicort 160/4.5 2 puffs BID        - corticosteroids: PO non-inferior to IV-> would continue with Solumedrol 60mg IV BID as long as NPO-> then switch to Prednisone 40mg qd x5 days  - on BiPAP 10/5 on FiO2 30%  - can trial off BiPAP now that gas is better-> VBG/ABG 1h post-removal  - get ABG in AM  - if no improvement, would get Pulmonology consult in the AM

## 2024-03-23 NOTE — H&P ADULT - PROBLEM SELECTOR PLAN 4
DVT PPx: lovenox 40mg qd  Diet: NPO while on BiPAP-> DASH  Ethics: full code  Dispo: pending clinical improvement DVT PPx: Lovenox 40mg qd  Diet: NPO while on BiPAP-> DASH  Ethics: full code  Dispo: pending clinical improvement

## 2024-03-23 NOTE — PROGRESS NOTE ADULT - ASSESSMENT
Mr. Peterson is 58-year-old male, past medical history of emphysema/COPD and hyperlipidemia, presents to ED for worsening shortness of breath associated with chest pain x 2 days, admitted for acute hypoxic hypercapnic respiratory failure likely due to COPD exacerbation.

## 2024-03-23 NOTE — H&P ADULT - PROBLEM SELECTOR PLAN 2
Pt follows with Dr. Kevon Acuña, Pulm/Crit. He is on Spiriva, Fluticasone, Montelukast 10mg daily. Not on home O2 as of now. Likely due to chronic cigarette smoking, has quit now  - can have house Pulm follow  - management of COPD exacerbation as above  - would consider flu vaccine here  - would also give Pneumococcal vaccine if available in house

## 2024-03-23 NOTE — H&P ADULT - ASSESSMENT
Mr. Peterson is 58-year-old male, past medical history of emphysema/COPD and hyperlipidemia, presents to ED complaining of acute on chronic, worsening shortness of breath with hypoxemic respiratory failure requiring noninvasive ventilation. Mr. Peterson is 58-year-old male, past medical history of emphysema/COPD and hyperlipidemia, presents to ED for worsening shortness of breath associated with chest pain x 2 days, admitted for acute hypoxic hypercapnic respiratory failure likely due to COPD exacerbation. [ x ]  Lab studies personally reviewed  [ x ]  Radiology personally reviewed  [  ]  Old records personally reviewed    Mr. Peterson is 58-year-old male, past medical history of emphysema/COPD and hyperlipidemia, presents to ED for worsening shortness of breath associated with chest pain x 2 days, admitted for acute hypoxic hypercapnic respiratory failure likely due to COPD exacerbation. Acitretin Pregnancy And Lactation Text: This medication is Pregnancy Category X and should not be given to women who are pregnant or may become pregnant in the future. This medication is excreted in breast milk.

## 2024-03-23 NOTE — PROGRESS NOTE ADULT - SUBJECTIVE AND OBJECTIVE BOX
Medicine Progress Note      Patient is a 58y old  Male who presents with a chief complaint of Hypercapnic respiratory failure (23 Mar 2024 01:46)      SUBJECTIVE / OVERNIGHT EVENTS: This AM, patient seen and examined at bedside.      MEDICATIONS  (STANDING):  albuterol/ipratropium for Nebulization 3 milliLiter(s) Nebulizer every 4 hours  atorvastatin 10 milliGRAM(s) Oral at bedtime  azithromycin  IVPB 500 milliGRAM(s) IV Intermittent every 24 hours  budesonide 160 MICROgram(s)/formoterol 4.5 MICROgram(s) Inhaler 2 Puff(s) Inhalation two times a day  cefTRIAXone   IVPB 1000 milliGRAM(s) IV Intermittent every 24 hours  famotidine    Tablet 40 milliGRAM(s) Oral daily  hydrocortisone sodium succinate Injectable 60 milliGRAM(s) IV Push every 8 hours  montelukast 10 milliGRAM(s) Oral daily    MEDICATIONS  (PRN):  acetaminophen     Tablet .. 650 milliGRAM(s) Oral every 6 hours PRN Temp greater or equal to 38C (100.4F), Mild Pain (1 - 3)  aluminum hydroxide/magnesium hydroxide/simethicone Suspension 30 milliLiter(s) Oral every 4 hours PRN Dyspepsia  melatonin 3 milliGRAM(s) Oral at bedtime PRN Insomnia  ondansetron Injectable 4 milliGRAM(s) IV Push every 8 hours PRN Nausea and/or Vomiting    CAPILLARY BLOOD GLUCOSE        I&O's Summary      PHYSICAL EXAM:  Vital Signs Last 24 Hrs  T(C): 36.4 (23 Mar 2024 05:00), Max: 37.8 (22 Mar 2024 22:30)  T(F): 97.5 (23 Mar 2024 05:00), Max: 100 (22 Mar 2024 22:30)  HR: 67 (23 Mar 2024 05:00) (67 - 135)  BP: 98/74 (23 Mar 2024 05:00) (98/74 - 127/79)  BP(mean): --  RR: 18 (23 Mar 2024 05:00) (18 - 35)  SpO2: 100% (23 Mar 2024 05:00) (73% - 100%)    Parameters below as of 23 Mar 2024 05:00  Patient On (Oxygen Delivery Method): nasal cannula  O2 Flow (L/min): 6    CONSTITUTIONAL: NAD  HEENT: Moist oral mucosa  RESPIRATORY: Normal respiratory effort; lungs are clear to auscultation bilaterally  CARDIOVASCULAR: Regular rate and rhythm, normal S1 and S2, no murmur/rub/gallop, no lower extremity edema, peripheral pulses are palpable bilaterally  ABDOMEN: Soft, nondistended, nontender to palpation, normoactive bowel sounds, no rebound/guarding  PSYCH: A+O to person, place, and time  NEUROLOGY: Facial expression appears symmetric, no gross sensory deficits appreciated, moving all extremities spontaneously  SKIN: No rashes; no palpable lesions    LABS:                        13.0   3.38  )-----------( 276      ( 23 Mar 2024 06:30 )             40.2     03-22    140  |  100  |  5<L>  ----------------------------<  106<H>  3.5   |  27  |  0.77    Ca    9.8      22 Mar 2024 22:20    TPro  8.2  /  Alb  3.8  /  TBili  0.4  /  DBili  x   /  AST  26  /  ALT  21  /  AlkPhos  83  03-22    23:30 - VBG - pH: 7.40  | pCO2: 49    | pO2: 107   | Lactate: 1.6    22:20 - VBG - pH: 7.33  | pCO2: 60    | pO2: 35    | Lactate: 2.7      PT/INR - ( 22 Mar 2024 22:20 )   PT: 12.9 sec;   INR: 1.16 ratio         PTT - ( 22 Mar 2024 22:20 )  PTT:34.7 sec      Urinalysis Basic - ( 22 Mar 2024 22:20 )    Color: x / Appearance: x / SG: x / pH: x  Gluc: 106 mg/dL / Ketone: x  / Bili: x / Urobili: x   Blood: x / Protein: x / Nitrite: x   Leuk Esterase: x / RBC: x / WBC x   Sq Epi: x / Non Sq Epi: x / Bacteria: x        SARS-CoV-2: NotDetec (22 Mar 2024 22:20)      RADIOLOGY & ADDITIONAL TESTS:  Imaging from Last 24 Hours: Medicine Progress Note      Patient is a 58y old  Male who presents with a chief complaint of Hypercapnic respiratory failure (23 Mar 2024 01:46)      SUBJECTIVE / OVERNIGHT EVENTS: Admitted with for presumed COPD exacerbation. This AM, patient seen and examined at bedside. [facility]  #[id number] used. Patient spoke in complete sentences while talking. No chest pain. Did endorse shortness of breath during exam. Endorsed some abdominal distention. No nausea or vomiting.      MEDICATIONS  (STANDING):  albuterol/ipratropium for Nebulization 3 milliLiter(s) Nebulizer every 4 hours  atorvastatin 10 milliGRAM(s) Oral at bedtime  azithromycin  IVPB 500 milliGRAM(s) IV Intermittent every 24 hours  budesonide 160 MICROgram(s)/formoterol 4.5 MICROgram(s) Inhaler 2 Puff(s) Inhalation two times a day  cefTRIAXone   IVPB 1000 milliGRAM(s) IV Intermittent every 24 hours  famotidine    Tablet 40 milliGRAM(s) Oral daily  hydrocortisone sodium succinate Injectable 60 milliGRAM(s) IV Push every 8 hours  montelukast 10 milliGRAM(s) Oral daily    MEDICATIONS  (PRN):  acetaminophen     Tablet .. 650 milliGRAM(s) Oral every 6 hours PRN Temp greater or equal to 38C (100.4F), Mild Pain (1 - 3)  aluminum hydroxide/magnesium hydroxide/simethicone Suspension 30 milliLiter(s) Oral every 4 hours PRN Dyspepsia  melatonin 3 milliGRAM(s) Oral at bedtime PRN Insomnia  ondansetron Injectable 4 milliGRAM(s) IV Push every 8 hours PRN Nausea and/or Vomiting    CAPILLARY BLOOD GLUCOSE        I&O's Summary      PHYSICAL EXAM:  Vital Signs Last 24 Hrs  T(C): 36.4 (23 Mar 2024 05:00), Max: 37.8 (22 Mar 2024 22:30)  T(F): 97.5 (23 Mar 2024 05:00), Max: 100 (22 Mar 2024 22:30)  HR: 67 (23 Mar 2024 05:00) (67 - 135)  BP: 98/74 (23 Mar 2024 05:00) (98/74 - 127/79)  BP(mean): --  RR: 18 (23 Mar 2024 05:00) (18 - 35)  SpO2: 100% (23 Mar 2024 05:00) (73% - 100%)    Parameters below as of 23 Mar 2024 05:00  Patient On (Oxygen Delivery Method): nasal cannula  O2 Flow (L/min): 6    CONSTITUTIONAL: NAD  HEENT: Moist oral mucosa, nasal cannula in place  RESPIRATORY: Normal respiratory effort; lungs with trace inspiratory crackles and faint expiratory wheeze on auscultation bilaterally  CARDIOVASCULAR: Regular rate and rhythm, normal S1 and S2, no murmur/rub/gallop, no lower extremity edema, peripheral pulses are palpable bilaterally  ABDOMEN: Soft, mildly distended, nontender to palpation, normoactive bowel sounds, no rebound/guarding  PSYCH: A+O to person, place, and time  NEUROLOGY: Facial expression appears symmetric, no gross sensory deficits appreciated, moving all extremities spontaneously  SKIN: No rashes; no palpable lesions    LABS:                        13.0   3.38  )-----------( 276      ( 23 Mar 2024 06:30 )             40.2     03-22    140  |  100  |  5<L>  ----------------------------<  106<H>  3.5   |  27  |  0.77    Ca    9.8      22 Mar 2024 22:20    TPro  8.2  /  Alb  3.8  /  TBili  0.4  /  DBili  x   /  AST  26  /  ALT  21  /  AlkPhos  83  03-22    23:30 - VBG - pH: 7.40  | pCO2: 49    | pO2: 107   | Lactate: 1.6    22:20 - VBG - pH: 7.33  | pCO2: 60    | pO2: 35    | Lactate: 2.7      PT/INR - ( 22 Mar 2024 22:20 )   PT: 12.9 sec;   INR: 1.16 ratio         PTT - ( 22 Mar 2024 22:20 )  PTT:34.7 sec      Urinalysis Basic - ( 22 Mar 2024 22:20 )    Color: x / Appearance: x / SG: x / pH: x  Gluc: 106 mg/dL / Ketone: x  / Bili: x / Urobili: x   Blood: x / Protein: x / Nitrite: x   Leuk Esterase: x / RBC: x / WBC x   Sq Epi: x / Non Sq Epi: x / Bacteria: x        SARS-CoV-2: NotDetec (22 Mar 2024 22:20)      RADIOLOGY & ADDITIONAL TESTS:  Imaging from Last 24 Hours:

## 2024-03-23 NOTE — H&P ADULT - NSHPPHYSICALEXAM_GEN_ALL_CORE
VITALS:   T(C): 37.8 (03-22-24 @ 22:30), Max: 37.8 (03-22-24 @ 22:30)  HR: 95 (03-23-24 @ 00:00) (95 - 135)  BP: 113/74 (03-23-24 @ 00:00) (113/74 - 127/79)  RR: 30 (03-23-24 @ 00:00) (30 - 35)  SpO2: 100% (03-23-24 @ 00:00) (73% - 100%)    PHYSICAL EXAM:     GENERAL: NAD, lying in bed comfortably.  HEAD:  Atraumatic, normocephalic.  EYES: EOMI, conjunctiva and sclera clear.  ENT: Moist mucous membranes.  NECK: Supple, trachea midline.  CHEST/LUNG: CTAB. No rales, rhonchi, wheezing, or rubs. Unlabored respirations.  HEART: RRR, no M/R/G, S1/S2  ABDOMEN: Soft, nontender, nondistended, no organomegaly. Normoactive bowel sounds.  EXTREMITIES:  2+ peripheral pulses b/l, brisk capillary refill. No clubbing, cyanosis, or edema.  Neurological:  AAOx3, no focal deficits.   SKIN: No rashes or lesions.  PSYCH: Normal affect and mood.   Lines: VITALS:   T(C): 37.8 (03-22-24 @ 22:30), Max: 37.8 (03-22-24 @ 22:30)  HR: 95 (03-23-24 @ 00:00) (95 - 135)  BP: 113/74 (03-23-24 @ 00:00) (113/74 - 127/79)  RR: 30 (03-23-24 @ 00:00) (30 - 35)  SpO2: 100% (03-23-24 @ 00:00) (73% - 100%)    PHYSICAL EXAM:     GENERAL: NAD, lying in bed comfortably. on BiPAP machine  HEAD:  Atraumatic, normocephalic.  EYES: EOMI, conjunctiva and sclera clear.  ENT: Moist mucous membranes.  NECK: Supple, trachea midline.  CHEST/LUNG: CTAB. No rales, rhonchi, wheezing, or rubs. Unlabored respirations.  HEART: RRR, no M/R/G, S1/S2  ABDOMEN: Soft, nontender, protuberant, no organomegaly. Normoactive bowel sounds.  EXTREMITIES:  2+ peripheral pulses b/l, brisk capillary refill. No clubbing, cyanosis, or edema.  Neurological:  AAOx3, no focal deficits.   SKIN: No rashes or lesions.  PSYCH: Normal affect and mood.   Lines:    EKG: sinus tachycardia without ST changes; QTc 420 VITALS:   T(C): 37.8 (03-22-24 @ 22:30), Max: 37.8 (03-22-24 @ 22:30)  HR: 95 (03-23-24 @ 00:00) (95 - 135)  BP: 113/74 (03-23-24 @ 00:00) (113/74 - 127/79)  RR: 30 (03-23-24 @ 00:00) (30 - 35)  SpO2: 100% (03-23-24 @ 00:00) (73% - 100%)    PHYSICAL EXAM:     GENERAL: NAD, lying in bed comfortably. On BiPAP machine  HEAD:  Atraumatic, normocephalic.  EYES: EOMI, conjunctiva and sclera clear.  ENT: Moist mucous membranes.  NECK: Supple, trachea midline.  CHEST/LUNG: CTAB. No rales, rhonchi, wheezing, or rubs. Unlabored respirations.  HEART: RRR, no M/R/G, S1/S2  ABDOMEN: Soft, nontender, protuberant, no organomegaly. Normoactive bowel sounds.  EXTREMITIES:  2+ peripheral pulses b/l, brisk capillary refill. No clubbing, cyanosis, or edema.  Neurological:  AAOx3, no focal deficits.   SKIN: No rashes or lesions.  PSYCH: Normal affect and mood.   Lines:

## 2024-03-23 NOTE — ED ADULT NURSE REASSESSMENT NOTE - NS ED NURSE REASSESS COMMENT FT1
per MD vashti campos pt to be trialed to 6L NC due to VBG improvement. pt trialed to 6LNC, tolerating well. no signs of distress noted at this time.   [facility] pt [facility] [place] [facility] with all belongings.

## 2024-03-23 NOTE — PROGRESS NOTE ADULT - PROBLEM SELECTOR PLAN 1
Pt p/w worsening dyspnea, with reported O2 sat 70s on RA. Has a known hx of COPD, is not on home oxygen. VBG shows hypercarbia (60) with HCO3- of  32 on VBG, indicating chronic hypercapnia to an extent. Repeat VBG (looks like an ABG based on O2%) shows PCO2 down to 49 on BiPAP. C/f Acute Hypoxic Hypercapnic Respiratory Failure from COPD exacerbation: has dyspnea, increased sputum production and cough.  - c/f COPD exacerbation  - management: Antibiotics, Bronchodilators, Corticosteroids        - agree with coverage for CAP ( CTX + Azithro), 5d treatment        - bronchodilator: would do DuoNebs q4h for standing for now; Symbicort 160/4.5 2 puffs BID        - corticosteroids: PO non-inferior to IV-> would continue with Solumedrol 60mg IV BID as long as NPO-> then switch to Prednisone 40mg qd x5 days  - on BiPAP 10/5 on FiO2 30%  - can trial off BiPAP now that gas is better-> VBG/ABG 1h post-removal  - get ABG in AM Pt p/w worsening dyspnea, with reported O2 sat 70s on RA. Has a known hx of COPD, is not on home oxygen. VBG shows hypercarbia (60) with HCO3- of  32 on VBG, indicating chronic hypercapnia to an extent. Repeat VBG (looks like an ABG based on O2%) shows PCO2 down to 49 on BiPAP. C/f Acute Hypoxic Hypercapnic Respiratory Failure from COPD exacerbation: has dyspnea, increased sputum production and cough.  - c/f COPD exacerbation: unclear source at this time. No consolidation on CXR, RVP negative  - management: Antibiotics, Bronchodilators, Corticosteroids  - Continue with coverage for CAP ( CTX + Azithro), 5d treatment. Follow up strep pneumo and urine legionella  - Continue bronchodilator: DuoNebs q4h for standing for now; Symbicort 160/4.5 2 puffs BID  - Continue steroids: Hydrocortisone 60mg q8 for three doses to end 3/23. Methylpred 40mg q12 on 3/24, then 3 days of PO prednisone 40mg to finish  - Weaned off BiPAP to nasal cannula. Continue nasal cannula and wean as tolerated  - Will pursue ambulatory saturations on 3/24 or 3/25 Pt p/w worsening dyspnea, with reported O2 sat 70s on RA. Has a known hx of COPD, is not on home oxygen. VBG shows hypercarbia (60) with HCO3- of  32 on VBG, indicating chronic hypercapnia to an extent. Repeat VBG (looks like an ABG based on O2%) shows PCO2 down to 49 on BiPAP. C/f Acute Hypoxic Hypercapnic Respiratory Failure from COPD exacerbation: has dyspnea, increased sputum production and cough.  - c/f COPD exacerbation: unclear source at this time. No consolidation on CXR, RVP negative  - management: Antibiotics, Bronchodilators, Corticosteroids  - Continue with coverage for CAP ( CTX + Azithro), 5d treatment. Follow up strep pneumo and urine legionella  - Continue bronchodilator: DuoNebs q4h for standing for now; Symbicort 160/4.5 2 puffs BID  - Continue steroids: Hydrocortisone 60mg x2 doses on 3/23. PO prednisone 40mg daily for 4 additional days to finish  - Weaned off BiPAP to nasal cannula. Continue nasal cannula and wean as tolerated  - Will pursue ambulatory saturations on 3/24 or 3/25

## 2024-03-23 NOTE — H&P ADULT - TIME BILLING
Preparing to see the patient including review of tests and other providers' notes, confirming history with patient/family member, performing medical examination and evaluation, counseling and educating the patient/family/caregiver, ordering medications, tests and procedures, communicating with other health care professionals, documenting clinical information in the EMR, independently interpreting results and communicating results to the patient/family/caregiver, care coordination.  Also with repeat visit to patient and family with explanation of test results and facilitation of additional questions.

## 2024-03-23 NOTE — PATIENT PROFILE ADULT - FALL HARM RISK - RISK INTERVENTIONS

## 2024-03-23 NOTE — PROGRESS NOTE ADULT - PROBLEM SELECTOR PLAN 4
DVT PPx: lovenox 40mg qd  Diet: NPO while on BiPAP-> DASH  Ethics: full code  Dispo: pending clinical improvement DVT PPx: lovenox 40mg qd  Diet: DASH/TLC  Ethics: full code  Dispo: pending clinical improvement

## 2024-03-23 NOTE — H&P ADULT - ATTENDING COMMENTS
58 year-old male, with past history significant for COPD/Emphysema and Smoking (no longer smokes), presented to the ED secondary to acute on chronic shortness of breath, with worsening over time.  Coughing with clear phlegm initially, but now with yellowish phlegm.  Report of subjective fever.  Initially at 73% on RA in the ED.  Placed on BiPAP in the ED.  pH = 7.33, pCO2 = 60.  Repeat venous blood gas with pH = 7.40, pCO2 = 7.49.  CXR = comminuted interstitial opacities bilateral, which may reflect interstitial pulmonary edema.  No focal consolidation.  (personally reviewed).  RVP/COVID-19, Flu, RSV negative.  Consideration for bronchiectasis.  S/p azithromycin and ceftriaxone in the ED; continuing for 5 day course.  Bronchodilator therapy.  Continuing with steroid.  Liberation from BiPAP with repeat VBG one hour afterwards; please f/u.  F/u AM lab-work.  If no improvement, would get pulmonology consult.    All other management as prescribed.

## 2024-03-23 NOTE — H&P ADULT - NSHPLABSRESULTS_GEN_ALL_CORE
LABS:                           14.3   6.94  )-----------( 295      ( 22 Mar 2024 22:20 )             44.0     03-22    140  |  100  |  5<L>  ----------------------------<  106<H>  3.5   |  27  |  0.77    Ca    9.8      22 Mar 2024 22:20    TPro  8.2  /  Alb  3.8  /  TBili  0.4  /  DBili  x   /  AST  26  /  ALT  21  /  AlkPhos  83  03-22        PT/INR - ( 22 Mar 2024 22:20 )   PT: 12.9 sec;   INR: 1.16 ratio         PTT - ( 22 Mar 2024 22:20 )  PTT:34.7 sec      Urinalysis Basic - ( 22 Mar 2024 22:20 )    Color: x / Appearance: x / SG: x / pH: x  Gluc: 106 mg/dL / Ketone: x  / Bili: x / Urobili: x   Blood: x / Protein: x / Nitrite: x   Leuk Esterase: x / RBC: x / WBC x   Sq Epi: x / Non Sq Epi: x / Bacteria: x        LIVER FUNCTIONS - ( 22 Mar 2024 22:20 )  Alb: 3.8 g/dL / Pro: 8.2 g/dL / ALK PHOS: 83 U/L / ALT: 21 U/L / AST: 26 U/L / GGT: x                 I&O's Summary         / Troponin T, High Sensitivity Result: 19 ng/L (03-22-24 @ 23:30)  Troponin T, High Sensitivity Result: 17 ng/L (03-22-24 @ 22:20)      CAPILLARY BLOOD GLUCOSE                MICRO: LABS:                           14.3   6.94  )-----------( 295      ( 22 Mar 2024 22:20 )             44.0     03-22    140  |  100  |  5<L>  ----------------------------<  106<H>  3.5   |  27  |  0.77    Ca    9.8      22 Mar 2024 22:20    TPro  8.2  /  Alb  3.8  /  TBili  0.4  /  DBili  x   /  AST  26  /  ALT  21  /  AlkPhos  83  03-22        PT/INR - ( 22 Mar 2024 22:20 )   PT: 12.9 sec;   INR: 1.16 ratio         PTT - ( 22 Mar 2024 22:20 )  PTT:34.7 sec      Urinalysis Basic - ( 22 Mar 2024 22:20 )    Color: x / Appearance: x / SG: x / pH: x  Gluc: 106 mg/dL / Ketone: x  / Bili: x / Urobili: x   Blood: x / Protein: x / Nitrite: x   Leuk Esterase: x / RBC: x / WBC x   Sq Epi: x / Non Sq Epi: x / Bacteria: x      LIVER FUNCTIONS - ( 22 Mar 2024 22:20 )  Alb: 3.8 g/dL / Pro: 8.2 g/dL / ALK PHOS: 83 U/L / ALT: 21 U/L / AST: 26 U/L / GGT: x           I&O's Summary     / Troponin T, High Sensitivity Result: 19 ng/L (03-22-24 @ 23:30)  Troponin T, High Sensitivity Result: 17 ng/L (03-22-24 @ 22:20)      CAPILLARY BLOOD GLUCOSE    MICRO:    =========================================================    EKG: sinus tachycardia at 112 bpm, without ST changes; QTc 423    =========================================================  .

## 2024-03-23 NOTE — H&P ADULT - HISTORY OF PRESENT ILLNESS
Mr. Evelyn Peterson is a 57 Y/O M w/PMH COPD (follows Dr. Kevon Acuña), HLD who comes in for worsening shortness of breath.    Patient has been having shortness of breath for 1 month; denies trouble before that. Over the last 2-3 days, shortness of breath has worsened, cannot walk to bathroom from room before becoming very short of breath. Prior to that, could walk 1-2 block prior to SOB. Also endorsing cough x1 month but producing yellow sputum with more coughing over the last week. Has been feeling feverish at home for last two weeks, taking Tylenol, but not measuring temp at home. Says he has emphysema, taking nebulizer at home.     Recently saw Dr. Acuña 3/20, who apparently refused to prescribe patient Azithromycin on request. Pt says Azithromycin helped him in the past. Of note, patient was on Levaquin and Pred taper per Dr. Acuña's med rec but when pt's daughter-in-law presented current meds, not on Levaquin or Prednisone.    Patient denies headache, dizziness, weakness, chest pain, abdominal pain, diarrhea, constipation, leg swelling, rashes, chills.    In the ED, the patient received Methylprednisone 125mg, Azithro, CTX x1.     Mr. Evelyn Peterson is a 57 Y/O M w/PMH COPD (follows Dr. Kevon Acuña), HLD who comes in for worsening shortness of breath.    Patient has been having shortness of breath for 1 month; denies trouble before that. Over the last 2-3 days, shortness of breath has worsened, cannot walk to bathroom from room before becoming very short of breath. Prior to that, could walk 1-2 block prior to SOB. Also endorsing cough x1 month but producing yellow sputum with more coughing over the last week. Has been feeling feverish at home for last two weeks, taking Tylenol, but not measuring temp at home. Says he has emphysema, taking nebulizer at home.     Recently saw Dr. Acuña 3/20, who apparently refused to prescribe patient Azithromycin on request. Pt says Azithromycin helped him in the past. Of note, patient was on Levaquin and Pred taper per Dr. Acuña's med rec but when pt's daughter-in-law presented current meds, not on Levaquin or Prednisone.    Pt also complaining of stomach getting bigger for about 2 months. Denies stomach pain, constipation, diarrhea. Denies recent weight gain. Has not tried new medications.    Patient denies headache, dizziness, weakness, chest pain, abdominal pain, diarrhea, constipation, leg swelling, rashes, chills.    In the ED, the patient received Methylprednisone 125mg, Azithro, CTX x1.     Mr. Evelyn Peterson is a 57 Y/O M w/PMH COPD (follows Dr. Kevon Acuña), HLD who comes in for worsening shortness of breath.  Elects     Patient has been having shortness of breath for 1 month; denies trouble before that. Over the last 2-3 days, shortness of breath has worsened, cannot walk to bathroom from room before becoming very short of breath. Prior to that, could walk 1-2 block prior to SOB. Also endorsing cough x1 month but producing yellow sputum with more coughing over the last week. Has been feeling feverish at home for last two weeks, taking Tylenol, but not measuring temp at home. Says he has emphysema, taking nebulizer at home.     Recently saw Dr. Acuña 3/20, who apparently refused to prescribe patient Azithromycin on request. Pt says Azithromycin helped him in the past. Of note, patient was on Levaquin and Pred taper per Dr. Acuña's med rec but when pt's daughter-in-law presented current meds, not on Levaquin or Prednisone.    Pt also complaining of stomach getting bigger for about 2 months. Denies stomach pain, constipation, diarrhea. Denies recent weight gain. Has not tried new medications.  No increased eructations or flatus    Patient denies headache, dizziness, weakness, chest pain, abdominal pain, diarrhea, constipation, leg swelling, rashes, chills.    In the ED, the patient received Methylprednisone 125mg, Azithro, CTX x1.

## 2024-03-23 NOTE — H&P ADULT - NSHPREVIEWOFSYSTEMS_GEN_ALL_CORE
REVIEW OF SYSTEMS:  CONSTITUTIONAL: + fever  EYES: No eye pain, visual disturbances, or discharge  ENMT:  No difficulty hearing, tinnitus, vertigo; No sinus or throat pain  NECK: No pain or stiffness  RESPIRATORY: + cough, + SOB  CARDIOVASCULAR: No chest pain, palpitations, dizziness, or leg swelling  GASTROINTESTINAL: No abdominal or epigastric pain. No nausea, vomiting, or hematemesis; No diarrhea or constipation. No melena or hematochezia. + abdominal distention  GENITOURINARY: No dysuria, frequency, hematuria, or incontinence  NEUROLOGICAL: No headaches, memory loss, loss of strength, numbness, or tremors  SKIN: No itching, burning, rashes, or lesions   LYMPH NODES: No enlarged glands  ENDOCRINE: No heat or cold intolerance; No hair loss  MUSCULOSKELETAL: No joint pain or swelling; No muscle, back, or extremity pain  PSYCHIATRIC: No depression, anxiety, mood swings, or difficulty sleeping  HEME/LYMPH: No easy bruising, or bleeding gums  ALLERGY AND IMMUNOLOGIC: No hives or eczema REVIEW OF SYSTEMS:  CONSTITUTIONAL: + fever.  No chills or diaphoresis  EYES: No eye pain, visual disturbances, or discharge  ENMT:  No difficulty hearing, tinnitus, vertigo; No sinus or throat pain  NECK: No pain or stiffness  RESPIRATORY: + cough, + SOB  CARDIOVASCULAR: No chest pain, palpitations, dizziness, or leg swelling  GASTROINTESTINAL: Abdominal distention w/o pain or discomfort.  No epigastric pain. No nausea, vomiting, or hematemesis; No diarrhea or constipation. No melena or hematochezia  GENITOURINARY: No dysuria, frequency, hematuria, or incontinence  NEUROLOGICAL: No headaches, memory loss, loss of strength, numbness, or tremors  SKIN: No itching, burning, rashes, or lesions   LYMPH NODES: No enlarged glands  ENDOCRINE: No heat or cold intolerance; No hair loss  MUSCULOSKELETAL: No joint pain or swelling; No muscle, back, or extremity pain  PSYCHIATRIC: No depression, anxiety, mood swings, or difficulty sleeping  HEME/LYMPH: No easy bruising, or bleeding gums  ALLERGY AND IMMUNOLOGIC: No hives or eczema

## 2024-03-23 NOTE — PROGRESS NOTE ADULT - PROBLEM SELECTOR PLAN 2
Pt follows with Dr. Kevon Acuña, Pulm/Crit. He is on Spiriva, Fluticasone, Montelukast 10mg daily. Not on home O2 as of now. Likely due to chronic cigarette smoking, has quit now  - can have house Pulm follow  - management of COPD exacerbation as above  - would consider flu vaccine here  - would also give Pneumococcal vaccine if available in house Pt follows with Dr. Kevon Acuña, Pulm/Crit. He is on Spiriva, Fluticasone, Montelukast 10mg daily. Not on home O2 as of now. Likely due to chronic cigarette smoking, has quit now  - Continue symbicort and duonebs  - Management of COPD exacerbation as above  - Consider flu vaccine here  - Consider Pneumococcal vaccine if has not had

## 2024-03-23 NOTE — ED ADULT NURSE REASSESSMENT NOTE - NS ED NURSE REASSESS COMMENT FT1
Break RN: Pt is A&Ox4, resting in stretcher with no complaints at this time. Pt continues to be tachypneic with respirations at 30 on BiPAP. Chest rise equal b/l. VS as noted in flow sheets. Pt denies chest pain, SOB, abdominal pain, N/V/D, h/a, dizziness, numbness/tingling or any urinary symptoms at this time. Cultures drawn prior to antibiotic administration. Blood culture orders placed by MD and now sent to lab. No acute distress noted. Safety maintained throughout.

## 2024-03-24 DIAGNOSIS — R14.0 ABDOMINAL DISTENSION (GASEOUS): ICD-10-CM

## 2024-03-24 LAB
24R-OH-CALCIDIOL SERPL-MCNC: 63.9 NG/ML — SIGNIFICANT CHANGE UP (ref 30–80)
ANION GAP SERPL CALC-SCNC: 9 MMOL/L — SIGNIFICANT CHANGE UP (ref 7–14)
BASE EXCESS BLDV CALC-SCNC: 5.4 MMOL/L — HIGH (ref -2–3)
BUN SERPL-MCNC: 18 MG/DL — SIGNIFICANT CHANGE UP (ref 7–23)
CA-I SERPL-SCNC: 1.24 MMOL/L — SIGNIFICANT CHANGE UP (ref 1.15–1.33)
CALCIUM SERPL-MCNC: 9.1 MG/DL — SIGNIFICANT CHANGE UP (ref 8.4–10.5)
CHLORIDE BLDV-SCNC: 100 MMOL/L — SIGNIFICANT CHANGE UP (ref 96–108)
CHLORIDE SERPL-SCNC: 100 MMOL/L — SIGNIFICANT CHANGE UP (ref 98–107)
CO2 BLDV-SCNC: 32.6 MMOL/L — HIGH (ref 22–26)
CO2 SERPL-SCNC: 29 MMOL/L — SIGNIFICANT CHANGE UP (ref 22–31)
CREAT SERPL-MCNC: 0.69 MG/DL — SIGNIFICANT CHANGE UP (ref 0.5–1.3)
EGFR: 108 ML/MIN/1.73M2 — SIGNIFICANT CHANGE UP
GAS PNL BLDV: 133 MMOL/L — LOW (ref 136–145)
GAS PNL BLDV: SIGNIFICANT CHANGE UP
GAS PNL BLDV: SIGNIFICANT CHANGE UP
GLUCOSE BLDV-MCNC: 218 MG/DL — HIGH (ref 70–99)
GLUCOSE SERPL-MCNC: 224 MG/DL — HIGH (ref 70–99)
HCO3 BLDV-SCNC: 31 MMOL/L — HIGH (ref 22–29)
HCT VFR BLD CALC: 38.1 % — LOW (ref 39–50)
HCT VFR BLDA CALC: 38 % — LOW (ref 39–51)
HGB BLD CALC-MCNC: 12.6 G/DL — SIGNIFICANT CHANGE UP (ref 12.6–17.4)
HGB BLD-MCNC: 12.5 G/DL — LOW (ref 13–17)
LACTATE BLDV-MCNC: 2.6 MMOL/L — HIGH (ref 0.5–2)
MAGNESIUM SERPL-MCNC: 2 MG/DL — SIGNIFICANT CHANGE UP (ref 1.6–2.6)
MCHC RBC-ENTMCNC: 29.1 PG — SIGNIFICANT CHANGE UP (ref 27–34)
MCHC RBC-ENTMCNC: 32.8 GM/DL — SIGNIFICANT CHANGE UP (ref 32–36)
MCV RBC AUTO: 88.6 FL — SIGNIFICANT CHANGE UP (ref 80–100)
NRBC # BLD: 0 /100 WBCS — SIGNIFICANT CHANGE UP (ref 0–0)
NRBC # FLD: 0 K/UL — SIGNIFICANT CHANGE UP (ref 0–0)
PCO2 BLDV: 49 MMHG — SIGNIFICANT CHANGE UP (ref 42–55)
PH BLDV: 7.41 — SIGNIFICANT CHANGE UP (ref 7.32–7.43)
PHOSPHATE SERPL-MCNC: 2.7 MG/DL — SIGNIFICANT CHANGE UP (ref 2.5–4.5)
PLATELET # BLD AUTO: 300 K/UL — SIGNIFICANT CHANGE UP (ref 150–400)
PO2 BLDV: 47 MMHG — HIGH (ref 25–45)
POTASSIUM BLDV-SCNC: 4.6 MMOL/L — SIGNIFICANT CHANGE UP (ref 3.5–5.1)
POTASSIUM SERPL-MCNC: 4.8 MMOL/L — SIGNIFICANT CHANGE UP (ref 3.5–5.3)
POTASSIUM SERPL-SCNC: 4.8 MMOL/L — SIGNIFICANT CHANGE UP (ref 3.5–5.3)
RBC # BLD: 4.3 M/UL — SIGNIFICANT CHANGE UP (ref 4.2–5.8)
RBC # FLD: 13 % — SIGNIFICANT CHANGE UP (ref 10.3–14.5)
SAO2 % BLDV: 78.5 % — SIGNIFICANT CHANGE UP (ref 67–88)
SODIUM SERPL-SCNC: 138 MMOL/L — SIGNIFICANT CHANGE UP (ref 135–145)
TSH SERPL-MCNC: 0.14 UIU/ML — LOW (ref 0.27–4.2)
WBC # BLD: 9.61 K/UL — SIGNIFICANT CHANGE UP (ref 3.8–10.5)
WBC # FLD AUTO: 9.61 K/UL — SIGNIFICANT CHANGE UP (ref 3.8–10.5)

## 2024-03-24 PROCEDURE — 99232 SBSQ HOSP IP/OBS MODERATE 35: CPT

## 2024-03-24 RX ORDER — SIMETHICONE 80 MG/1
80 TABLET, CHEWABLE ORAL DAILY
Refills: 0 | Status: DISCONTINUED | OUTPATIENT
Start: 2024-03-24 | End: 2024-03-27

## 2024-03-24 RX ADMIN — AZITHROMYCIN 255 MILLIGRAM(S): 500 TABLET, FILM COATED ORAL at 22:07

## 2024-03-24 RX ADMIN — Medication 40 MILLIGRAM(S): at 05:02

## 2024-03-24 RX ADMIN — Medication 3 MILLILITER(S): at 21:53

## 2024-03-24 RX ADMIN — FAMOTIDINE 40 MILLIGRAM(S): 10 INJECTION INTRAVENOUS at 11:12

## 2024-03-24 RX ADMIN — CEFTRIAXONE 100 MILLIGRAM(S): 500 INJECTION, POWDER, FOR SOLUTION INTRAMUSCULAR; INTRAVENOUS at 23:21

## 2024-03-24 RX ADMIN — SIMETHICONE 80 MILLIGRAM(S): 80 TABLET, CHEWABLE ORAL at 11:12

## 2024-03-24 RX ADMIN — BUDESONIDE AND FORMOTEROL FUMARATE DIHYDRATE 2 PUFF(S): 160; 4.5 AEROSOL RESPIRATORY (INHALATION) at 22:05

## 2024-03-24 RX ADMIN — ATORVASTATIN CALCIUM 10 MILLIGRAM(S): 80 TABLET, FILM COATED ORAL at 22:05

## 2024-03-24 RX ADMIN — ENOXAPARIN SODIUM 40 MILLIGRAM(S): 100 INJECTION SUBCUTANEOUS at 22:05

## 2024-03-24 RX ADMIN — MONTELUKAST 10 MILLIGRAM(S): 4 TABLET, CHEWABLE ORAL at 11:13

## 2024-03-24 RX ADMIN — CEFTRIAXONE 100 MILLIGRAM(S): 500 INJECTION, POWDER, FOR SOLUTION INTRAMUSCULAR; INTRAVENOUS at 00:58

## 2024-03-24 RX ADMIN — Medication 3 MILLILITER(S): at 10:52

## 2024-03-24 RX ADMIN — BUDESONIDE AND FORMOTEROL FUMARATE DIHYDRATE 2 PUFF(S): 160; 4.5 AEROSOL RESPIRATORY (INHALATION) at 09:17

## 2024-03-24 NOTE — PROGRESS NOTE ADULT - PROBLEM SELECTOR PLAN 1
Pt p/w worsening dyspnea, with reported O2 sat 70s on RA. Has a known hx of COPD, is not on home oxygen. VBG shows hypercarbia (60) with HCO3- of  32 on VBG, indicating chronic hypercapnia to an extent. Repeat VBG (looks like an ABG based on O2%) shows PCO2 down to 49 on BiPAP. C/f Acute Hypoxic Hypercapnic Respiratory Failure from COPD exacerbation: has dyspnea, increased sputum production and cough.  - c/f COPD exacerbation: unclear source at this time. No consolidation on CXR, RVP negative  - management: Antibiotics, Bronchodilators, Corticosteroids  - Continue with coverage for CAP ( CTX + Azithro), 5d treatment. Follow up strep pneumo and urine legionella  - Continue bronchodilator: DuoNebs q4h for standing for now; Symbicort 160/4.5 2 puffs BID  - Continue steroids: Hydrocortisone 60mg x2 doses on 3/23. PO prednisone 40mg daily for 4 additional days to finish  - Weaned off BiPAP to nasal cannula. Continue nasal cannula and wean as tolerated  - Will pursue ambulatory saturations on 3/24 or 3/25

## 2024-03-24 NOTE — PROGRESS NOTE ADULT - SUBJECTIVE AND OBJECTIVE BOX
INCOMPLETE NOTE    Alex Martha | PGY2| Pager: [place] [phone]) -- JAYLA ([phone])  Interval Events:    REVIEW OF SYSTEMS:  CONSTITUTIONAL: No weakness, fevers or chills  EYES/ENT: No visual changes;  No vertigo or throat pain   NECK: No pain or stiffness  RESPIRATORY: No cough, wheezing, hemoptysis; No shortness of breath  CARDIOVASCULAR: No chest pain or palpitations  GASTROINTESTINAL: No abdominal or epigastric pain. No nausea, vomiting, or hematemesis; No diarrhea or constipation. No melena or hematochezia.  GENITOURINARY: No dysuria, frequency or hematuria  NEUROLOGICAL: No numbness or weakness  SKIN: No itching, burning, rashes, or lesions   All other review of systems is negative unless indicated above.    OBJECTIVE:  ICU Vital Signs Last 24 Hrs  T(C): 36.6 (23 Mar 2024 21:30), Max: 36.7 (23 Mar 2024 15:23)  T(F): 97.9 (23 Mar 2024 21:30), Max: 98 (23 Mar 2024 15:23)  HR: 92 (24 Mar 2024 03:12) (74 - 100)  BP: 110/69 (23 Mar 2024 21:30) (105/67 - 142/77)  BP(mean): --  ABP: --  ABP(mean): --  RR: 18 (23 Mar 2024 21:30) (17 - 18)  SpO2: 98% (24 Mar 2024 03:12) (98% - 100%)    O2 Parameters below as of 23 Mar 2024 22:19  Patient On (Oxygen Delivery Method): nasal cannula              03-23 @ 07:01  -  03-24 @ 06:08  --------------------------------------------------------  IN: 0 mL / OUT: 300 mL / NET: -300 mL      CAPILLARY BLOOD GLUCOSE          PHYSICAL EXAM:  General: WN/WD NAD  Neurology: A&Ox3, nonfocal, PINZON x 4  Eyes: PERRLA/ EOMI, Gross vision intact  ENT/Neck: Neck supple, trachea midline, No JVD, Gross hearing intact  Respiratory: CTA B/L, No wheezing, rales, rhonchi  CV: RRR, +S1/S2, -S3/S4, no murmurs, rubs or gallops  Abdominal: Soft, NT, ND +BS, No HSM  MSK: 5/5 strength UE/LE bilaterally  Extremities: No edema, 2+ peripheral pulses  Skin: No Rashes, Hematoma, Ecchymosis  Incisions:   Tubes:    HOSPITAL MEDICATIONS:  MEDICATIONS  (STANDING):  albuterol/ipratropium for Nebulization 3 milliLiter(s) Nebulizer every 4 hours  atorvastatin 10 milliGRAM(s) Oral at bedtime  azithromycin  IVPB 500 milliGRAM(s) IV Intermittent every 24 hours  budesonide 160 MICROgram(s)/formoterol 4.5 MICROgram(s) Inhaler 2 Puff(s) Inhalation two times a day  cefTRIAXone   IVPB 1000 milliGRAM(s) IV Intermittent every 24 hours  enoxaparin Injectable 40 milliGRAM(s) SubCutaneous every 24 hours  famotidine    Tablet 40 milliGRAM(s) Oral daily  montelukast 10 milliGRAM(s) Oral daily  predniSONE   Tablet 40 milliGRAM(s) Oral daily    MEDICATIONS  (PRN):  acetaminophen     Tablet .. 650 milliGRAM(s) Oral every 6 hours PRN Temp greater or equal to 38C (100.4F), Mild Pain (1 - 3)  aluminum hydroxide/magnesium hydroxide/simethicone Suspension 30 milliLiter(s) Oral every 4 hours PRN Dyspepsia  melatonin 3 milliGRAM(s) Oral at bedtime PRN Insomnia  ondansetron Injectable 4 milliGRAM(s) IV Push every 8 hours PRN Nausea and/or Vomiting  polyethylene glycol 3350 17 Gram(s) Oral daily PRN Constipation      LABS:                        13.0   3.38  )-----------( 276      ( 23 Mar 2024 06:30 )             40.2     Hgb Trend: 13.0<--, 14.3<--  03-23    135  |  96<L>  |  8   ----------------------------<  193<H>  4.2   |  23  |  0.68    Ca    9.2      23 Mar 2024 06:30  Phos  3.9     03-23  Mg     1.90     03-23    TPro  7.7  /  Alb  3.3  /  TBili  0.3  /  DBili  x   /  AST  23  /  ALT  24  /  AlkPhos  71  03-23    Creatinine Trend: 0.68<--, 0.77<--  PT/INR - ( 22 Mar 2024 22:20 )   PT: 12.9 sec;   INR: 1.16 ratio         PTT - ( 22 Mar 2024 22:20 )  PTT:34.7 sec  Urinalysis Basic - ( 23 Mar 2024 06:30 )    Color: x / Appearance: x / SG: x / pH: x  Gluc: 193 mg/dL / Ketone: x  / Bili: x / Urobili: x   Blood: x / Protein: x / Nitrite: x   Leuk Esterase: x / RBC: x / WBC x   Sq Epi: x / Non Sq Epi: x / Bacteria: x        Venous Blood Gas:  03-22 @ 23:30  7.40/49/107/30/96.9  VBG Lactate: 1.6  Venous Blood Gas:  03-22 @ 22:20  7.33/60/35/32/50.3  VBG Lactate: 2.7      MICROBIOLOGY:          Alex Martha | PGY2| Pager: [place] [phone]) -- JAYLA ([phone])  Interval Events: NAEON; patient without acute complaints, neuro exam is stable; pending TTE with bubble study    REVIEW OF SYSTEMS:  No acute complaints    OBJECTIVE:  ICU Vital Signs Last 24 Hrs  T(C): 36.6 (23 Mar 2024 21:30), Max: 36.7 (23 Mar 2024 15:23)  T(F): 97.9 (23 Mar 2024 21:30), Max: 98 (23 Mar 2024 15:23)  HR: 92 (24 Mar 2024 03:12) (74 - 100)  BP: 110/69 (23 Mar 2024 21:30) (105/67 - 142/77)  BP(mean): --  ABP: --  ABP(mean): --  RR: 18 (23 Mar 2024 21:30) (17 - 18)  SpO2: 98% (24 Mar 2024 03:12) (98% - 100%)    O2 Parameters below as of 23 Mar 2024 22:19  Patient On (Oxygen Delivery Method): nasal cannula              03-23 @ 07:01  -  03-24 @ 06:08  --------------------------------------------------------  IN: 0 mL / OUT: 300 mL / NET: -300 mL      CAPILLARY BLOOD GLUCOSE          PHYSICAL EXAM:  General: WN/WD NAD  Neurology: A&Ox3, nonfocal, PINZON x 4  Eyes: PERRLA/ EOMI, Gross vision intact  Respiratory: CTA B/L, No wheezing, rales, rhonchi  CV: RRR, +S1/S2, -S3/S4  Abdominal: Soft, NT, ND +BS, No HSM  MSK: 5/5 strength UE/LE bilaterally  Extremities: No edema, 2+        HOSPITAL MEDICATIONS:  MEDICATIONS  (STANDING):  albuterol/ipratropium for Nebulization 3 milliLiter(s) Nebulizer every 4 hours  atorvastatin 10 milliGRAM(s) Oral at bedtime  azithromycin  IVPB 500 milliGRAM(s) IV Intermittent every 24 hours  budesonide 160 MICROgram(s)/formoterol 4.5 MICROgram(s) Inhaler 2 Puff(s) Inhalation two times a day  cefTRIAXone   IVPB 1000 milliGRAM(s) IV Intermittent every 24 hours  enoxaparin Injectable 40 milliGRAM(s) SubCutaneous every 24 hours  famotidine    Tablet 40 milliGRAM(s) Oral daily  montelukast 10 milliGRAM(s) Oral daily  predniSONE   Tablet 40 milliGRAM(s) Oral daily    MEDICATIONS  (PRN):  acetaminophen     Tablet .. 650 milliGRAM(s) Oral every 6 hours PRN Temp greater or equal to 38C (100.4F), Mild Pain (1 - 3)  aluminum hydroxide/magnesium hydroxide/simethicone Suspension 30 milliLiter(s) Oral every 4 hours PRN Dyspepsia  melatonin 3 milliGRAM(s) Oral at bedtime PRN Insomnia  ondansetron Injectable 4 milliGRAM(s) IV Push every 8 hours PRN Nausea and/or Vomiting  polyethylene glycol 3350 17 Gram(s) Oral daily PRN Constipation      LABS:                        13.0   3.38  )-----------( 276      ( 23 Mar 2024 06:30 )             40.2     Hgb Trend: 13.0<--, 14.3<--  03-23    135  |  96<L>  |  8   ----------------------------<  193<H>  4.2   |  23  |  0.68    Ca    9.2      23 Mar 2024 06:30  Phos  3.9     03-23  Mg     1.90     03-23    TPro  7.7  /  Alb  3.3  /  TBili  0.3  /  DBili  x   /  AST  23  /  ALT  24  /  AlkPhos  71  03-23    Creatinine Trend: 0.68<--, 0.77<--  PT/INR - ( 22 Mar 2024 22:20 )   PT: 12.9 sec;   INR: 1.16 ratio         PTT - ( 22 Mar 2024 22:20 )  PTT:34.7 sec  Urinalysis Basic - ( 23 Mar 2024 06:30 )    Color: x / Appearance: x / SG: x / pH: x  Gluc: 193 mg/dL / Ketone: x  / Bili: x / Urobili: x   Blood: x / Protein: x / Nitrite: x   Leuk Esterase: x / RBC: x / WBC x   Sq Epi: x / Non Sq Epi: x / Bacteria: x        Venous Blood Gas:  03-22 @ 23:30  7.40/49/107/30/96.9  VBG Lactate: 1.6  Venous Blood Gas:  03-22 @ 22:20  7.33/60/35/32/50.3  VBG Lactate: 2.7      MICROBIOLOGY:          Alex Martha | PGY2| Pager: [place] [phone]) -- JAYLA ([phone])  Interval Events: NAEON; Abd XR is negative for any acute obstruction or any free gas.     REVIEW OF SYSTEMS:  [ ] Abd is still distended, no signficant pain.    OBJECTIVE:  ICU Vital Signs Last 24 Hrs  T(C): 36.6 (23 Mar 2024 21:30), Max: 36.7 (23 Mar 2024 15:23)  T(F): 97.9 (23 Mar 2024 21:30), Max: 98 (23 Mar 2024 15:23)  HR: 92 (24 Mar 2024 03:12) (74 - 100)  BP: 110/69 (23 Mar 2024 21:30) (105/67 - 142/77)  BP(mean): --  ABP: --  ABP(mean): --  RR: 18 (23 Mar 2024 21:30) (17 - 18)  SpO2: 98% (24 Mar 2024 03:12) (98% - 100%)    O2 Parameters below as of 23 Mar 2024 22:19  Patient On (Oxygen Delivery Method): nasal cannula              03-23 @ 07:01  -  03-24 @ 06:08  --------------------------------------------------------  IN: 0 mL / OUT: 300 mL / NET: -300 mL      CAPILLARY BLOOD GLUCOSE          PHYSICAL EXAM:  General: WN/WD NAD  Neurology: A&Ox3, nonfocal, PINZON x 4  Eyes: PERRLA/ EOMI, Gross vision intact  Respiratory: CTA B/L, No wheezing, rales, rhonchi  CV: RRR, +S1/S2, -S3/S4  Abdominal: mildly distended, no pain.  MSK: 5/5 strength UE/LE bilaterally  Extremities: No edema, 2+        HOSPITAL MEDICATIONS:  MEDICATIONS  (STANDING):  albuterol/ipratropium for Nebulization 3 milliLiter(s) Nebulizer every 4 hours  atorvastatin 10 milliGRAM(s) Oral at bedtime  azithromycin  IVPB 500 milliGRAM(s) IV Intermittent every 24 hours  budesonide 160 MICROgram(s)/formoterol 4.5 MICROgram(s) Inhaler 2 Puff(s) Inhalation two times a day  cefTRIAXone   IVPB 1000 milliGRAM(s) IV Intermittent every 24 hours  enoxaparin Injectable 40 milliGRAM(s) SubCutaneous every 24 hours  famotidine    Tablet 40 milliGRAM(s) Oral daily  montelukast 10 milliGRAM(s) Oral daily  predniSONE   Tablet 40 milliGRAM(s) Oral daily    MEDICATIONS  (PRN):  acetaminophen     Tablet .. 650 milliGRAM(s) Oral every 6 hours PRN Temp greater or equal to 38C (100.4F), Mild Pain (1 - 3)  aluminum hydroxide/magnesium hydroxide/simethicone Suspension 30 milliLiter(s) Oral every 4 hours PRN Dyspepsia  melatonin 3 milliGRAM(s) Oral at bedtime PRN Insomnia  ondansetron Injectable 4 milliGRAM(s) IV Push every 8 hours PRN Nausea and/or Vomiting  polyethylene glycol 3350 17 Gram(s) Oral daily PRN Constipation      LABS:                        13.0   3.38  )-----------( 276      ( 23 Mar 2024 06:30 )             40.2     Hgb Trend: 13.0<--, 14.3<--  03-23    135  |  96<L>  |  8   ----------------------------<  193<H>  4.2   |  23  |  0.68    Ca    9.2      23 Mar 2024 06:30  Phos  3.9     03-23  Mg     1.90     03-23    TPro  7.7  /  Alb  3.3  /  TBili  0.3  /  DBili  x   /  AST  23  /  ALT  24  /  AlkPhos  71  03-23    Creatinine Trend: 0.68<--, 0.77<--  PT/INR - ( 22 Mar 2024 22:20 )   PT: 12.9 sec;   INR: 1.16 ratio         PTT - ( 22 Mar 2024 22:20 )  PTT:34.7 sec  Urinalysis Basic - ( 23 Mar 2024 06:30 )    Color: x / Appearance: x / SG: x / pH: x  Gluc: 193 mg/dL / Ketone: x  / Bili: x / Urobili: x   Blood: x / Protein: x / Nitrite: x   Leuk Esterase: x / RBC: x / WBC x   Sq Epi: x / Non Sq Epi: x / Bacteria: x        Venous Blood Gas:  03-22 @ 23:30  7.40/49/107/30/96.9  VBG Lactate: 1.6  Venous Blood Gas:  03-22 @ 22:20  7.33/60/35/32/50.3  VBG Lactate: 2.7      MICROBIOLOGY:

## 2024-03-24 NOTE — PROGRESS NOTE ADULT - PROBLEM SELECTOR PLAN 3
c/w Atorvastatin 20mg qd Pt follows with Dr. Kevon Acuña, Pulm/Crit. He is on Spiriva, Fluticasone, Montelukast 10mg daily. Not on home O2 as of now. Likely due to chronic cigarette smoking, has quit now  - Continue symbicort and duonebs  - Management of COPD exacerbation as above  - Consider flu vaccine here  - Consider Pneumococcal vaccine if has not had

## 2024-03-24 NOTE — PROGRESS NOTE ADULT - PROBLEM SELECTOR PLAN 4
DVT PPx: lovenox 40mg qd  Diet: DASH/TLC  Ethics: full code  Dispo: pending clinical improvement c/w Atorvastatin 20mg qd

## 2024-03-24 NOTE — PROGRESS NOTE ADULT - PROBLEM SELECTOR PLAN 2
Pt follows with Dr. Kevon Acuña, Pulm/Crit. He is on Spiriva, Fluticasone, Montelukast 10mg daily. Not on home O2 as of now. Likely due to chronic cigarette smoking, has quit now  - Continue symbicort and duonebs  - Management of COPD exacerbation as above  - Consider flu vaccine here  - Consider Pneumococcal vaccine if has not had Endorses 1-2 day history of abdominal distension  Abdominal XR without any signs cocerning for free air or stool burden  Patient having regular bowel movements  Reports feeling bloated    Plan:  Simethicone PRN

## 2024-03-25 LAB
ALBUMIN SERPL ELPH-MCNC: 3.3 G/DL — SIGNIFICANT CHANGE UP (ref 3.3–5)
ALP SERPL-CCNC: 61 U/L — SIGNIFICANT CHANGE UP (ref 40–120)
ALT FLD-CCNC: 24 U/L — SIGNIFICANT CHANGE UP (ref 4–41)
ANION GAP SERPL CALC-SCNC: 10 MMOL/L — SIGNIFICANT CHANGE UP (ref 7–14)
AST SERPL-CCNC: 22 U/L — SIGNIFICANT CHANGE UP (ref 4–40)
BASE EXCESS BLDV CALC-SCNC: 7.6 MMOL/L — HIGH (ref -2–3)
BILIRUB SERPL-MCNC: <0.2 MG/DL — SIGNIFICANT CHANGE UP (ref 0.2–1.2)
BUN SERPL-MCNC: 15 MG/DL — SIGNIFICANT CHANGE UP (ref 7–23)
CALCIUM SERPL-MCNC: 8.7 MG/DL — SIGNIFICANT CHANGE UP (ref 8.4–10.5)
CHLORIDE SERPL-SCNC: 98 MMOL/L — SIGNIFICANT CHANGE UP (ref 98–107)
CO2 BLDV-SCNC: 35.3 MMOL/L — HIGH (ref 22–26)
CO2 SERPL-SCNC: 31 MMOL/L — SIGNIFICANT CHANGE UP (ref 22–31)
CREAT SERPL-MCNC: 0.65 MG/DL — SIGNIFICANT CHANGE UP (ref 0.5–1.3)
EGFR: 110 ML/MIN/1.73M2 — SIGNIFICANT CHANGE UP
GLUCOSE SERPL-MCNC: 134 MG/DL — HIGH (ref 70–99)
HCO3 BLDV-SCNC: 34 MMOL/L — HIGH (ref 22–29)
HCT VFR BLD CALC: 39.3 % — SIGNIFICANT CHANGE UP (ref 39–50)
HGB BLD-MCNC: 12.7 G/DL — LOW (ref 13–17)
MAGNESIUM SERPL-MCNC: 2.2 MG/DL — SIGNIFICANT CHANGE UP (ref 1.6–2.6)
MCHC RBC-ENTMCNC: 28.9 PG — SIGNIFICANT CHANGE UP (ref 27–34)
MCHC RBC-ENTMCNC: 32.3 GM/DL — SIGNIFICANT CHANGE UP (ref 32–36)
MCV RBC AUTO: 89.3 FL — SIGNIFICANT CHANGE UP (ref 80–100)
NRBC # BLD: 0 /100 WBCS — SIGNIFICANT CHANGE UP (ref 0–0)
NRBC # FLD: 0 K/UL — SIGNIFICANT CHANGE UP (ref 0–0)
PCO2 BLDV: 52 MMHG — SIGNIFICANT CHANGE UP (ref 42–55)
PH BLDV: 7.42 — SIGNIFICANT CHANGE UP (ref 7.32–7.43)
PHOSPHATE SERPL-MCNC: 2.8 MG/DL — SIGNIFICANT CHANGE UP (ref 2.5–4.5)
PLATELET # BLD AUTO: 303 K/UL — SIGNIFICANT CHANGE UP (ref 150–400)
PO2 BLDV: 66 MMHG — HIGH (ref 25–45)
POTASSIUM SERPL-MCNC: 4 MMOL/L — SIGNIFICANT CHANGE UP (ref 3.5–5.3)
POTASSIUM SERPL-SCNC: 4 MMOL/L — SIGNIFICANT CHANGE UP (ref 3.5–5.3)
PROT SERPL-MCNC: 6.9 G/DL — SIGNIFICANT CHANGE UP (ref 6–8.3)
RBC # BLD: 4.4 M/UL — SIGNIFICANT CHANGE UP (ref 4.2–5.8)
RBC # FLD: 12.9 % — SIGNIFICANT CHANGE UP (ref 10.3–14.5)
SAO2 % BLDV: 92.6 % — HIGH (ref 67–88)
SODIUM SERPL-SCNC: 139 MMOL/L — SIGNIFICANT CHANGE UP (ref 135–145)
T4 FREE SERPL-MCNC: 1.3 NG/DL — SIGNIFICANT CHANGE UP (ref 0.9–1.8)
WBC # BLD: 8.83 K/UL — SIGNIFICANT CHANGE UP (ref 3.8–10.5)
WBC # FLD AUTO: 8.83 K/UL — SIGNIFICANT CHANGE UP (ref 3.8–10.5)

## 2024-03-25 PROCEDURE — 99232 SBSQ HOSP IP/OBS MODERATE 35: CPT | Mod: GC

## 2024-03-25 RX ORDER — SENNA PLUS 8.6 MG/1
2 TABLET ORAL AT BEDTIME
Refills: 0 | Status: DISCONTINUED | OUTPATIENT
Start: 2024-03-25 | End: 2024-03-27

## 2024-03-25 RX ADMIN — ATORVASTATIN CALCIUM 10 MILLIGRAM(S): 80 TABLET, FILM COATED ORAL at 22:19

## 2024-03-25 RX ADMIN — BUDESONIDE AND FORMOTEROL FUMARATE DIHYDRATE 2 PUFF(S): 160; 4.5 AEROSOL RESPIRATORY (INHALATION) at 21:27

## 2024-03-25 RX ADMIN — MONTELUKAST 10 MILLIGRAM(S): 4 TABLET, CHEWABLE ORAL at 12:25

## 2024-03-25 RX ADMIN — Medication 3 MILLILITER(S): at 21:27

## 2024-03-25 RX ADMIN — Medication 3 MILLILITER(S): at 16:50

## 2024-03-25 RX ADMIN — Medication 3 MILLILITER(S): at 23:32

## 2024-03-25 RX ADMIN — AZITHROMYCIN 255 MILLIGRAM(S): 500 TABLET, FILM COATED ORAL at 22:19

## 2024-03-25 RX ADMIN — FAMOTIDINE 40 MILLIGRAM(S): 10 INJECTION INTRAVENOUS at 12:25

## 2024-03-25 RX ADMIN — Medication 3 MILLILITER(S): at 10:04

## 2024-03-25 RX ADMIN — CEFTRIAXONE 100 MILLIGRAM(S): 500 INJECTION, POWDER, FOR SOLUTION INTRAMUSCULAR; INTRAVENOUS at 23:28

## 2024-03-25 RX ADMIN — BUDESONIDE AND FORMOTEROL FUMARATE DIHYDRATE 2 PUFF(S): 160; 4.5 AEROSOL RESPIRATORY (INHALATION) at 09:25

## 2024-03-25 RX ADMIN — SENNA PLUS 2 TABLET(S): 8.6 TABLET ORAL at 22:19

## 2024-03-25 RX ADMIN — Medication 40 MILLIGRAM(S): at 05:28

## 2024-03-25 RX ADMIN — ENOXAPARIN SODIUM 40 MILLIGRAM(S): 100 INJECTION SUBCUTANEOUS at 22:19

## 2024-03-25 RX ADMIN — POLYETHYLENE GLYCOL 3350 17 GRAM(S): 17 POWDER, FOR SOLUTION ORAL at 12:25

## 2024-03-25 NOTE — PROGRESS NOTE ADULT - SUBJECTIVE AND OBJECTIVE BOX
Medicine Progress Note      Patient is a 58y old  Male who presents with a chief complaint of Hypercapnic respiratory failure (24 Mar 2024 06:08)      SUBJECTIVE / OVERNIGHT EVENTS: This AM, patient seen and examined at bedside.      MEDICATIONS  (STANDING):  albuterol/ipratropium for Nebulization 3 milliLiter(s) Nebulizer every 4 hours  atorvastatin 10 milliGRAM(s) Oral at bedtime  azithromycin  IVPB 500 milliGRAM(s) IV Intermittent every 24 hours  budesonide 160 MICROgram(s)/formoterol 4.5 MICROgram(s) Inhaler 2 Puff(s) Inhalation two times a day  cefTRIAXone   IVPB 1000 milliGRAM(s) IV Intermittent every 24 hours  enoxaparin Injectable 40 milliGRAM(s) SubCutaneous every 24 hours  famotidine    Tablet 40 milliGRAM(s) Oral daily  montelukast 10 milliGRAM(s) Oral daily  predniSONE   Tablet 40 milliGRAM(s) Oral daily    MEDICATIONS  (PRN):  acetaminophen     Tablet .. 650 milliGRAM(s) Oral every 6 hours PRN Temp greater or equal to 38C (100.4F), Mild Pain (1 - 3)  aluminum hydroxide/magnesium hydroxide/simethicone Suspension 30 milliLiter(s) Oral every 4 hours PRN Dyspepsia  melatonin 3 milliGRAM(s) Oral at bedtime PRN Insomnia  ondansetron Injectable 4 milliGRAM(s) IV Push every 8 hours PRN Nausea and/or Vomiting  polyethylene glycol 3350 17 Gram(s) Oral daily PRN Constipation  simethicone 80 milliGRAM(s) Chew daily PRN Indigestion    CAPILLARY BLOOD GLUCOSE        I&O's Summary      PHYSICAL EXAM:  Vital Signs Last 24 Hrs  T(C): 36.6 (25 Mar 2024 05:30), Max: 36.6 (25 Mar 2024 05:30)  T(F): 97.9 (25 Mar 2024 05:30), Max: 97.9 (25 Mar 2024 05:30)  HR: 92 (25 Mar 2024 05:30) (66 - 101)  BP: 107/62 (25 Mar 2024 05:30) (100/52 - 110/62)  BP(mean): --  RR: 18 (25 Mar 2024 05:30) (18 - 20)  SpO2: 98% (25 Mar 2024 05:30) (98% - 99%)    Parameters below as of 25 Mar 2024 05:30  Patient On (Oxygen Delivery Method): nasal cannula  O2 Flow (L/min): 2    CONSTITUTIONAL: NAD  HEENT: Moist oral mucosa  RESPIRATORY: Normal respiratory effort; lungs are clear to auscultation bilaterally  CARDIOVASCULAR: Regular rate and rhythm, normal S1 and S2, no murmur/rub/gallop, no lower extremity edema, peripheral pulses are palpable bilaterally  ABDOMEN: Soft, nondistended, nontender to palpation, normoactive bowel sounds, no rebound/guarding  PSYCH: A+O to person, place, and time  NEUROLOGY: Facial expression appears symmetric, no gross sensory deficits appreciated, moving all extremities spontaneously  SKIN: No rashes; no palpable lesions    LABS:                        12.7   8.83  )-----------( 303      ( 25 Mar 2024 06:19 )             39.3     03-25    139  |  98  |  15  ----------------------------<  134<H>  4.0   |  31  |  0.65    Ca    8.7      25 Mar 2024 06:19  Phos  2.8     03-25  Mg     2.20     03-25    TPro  6.9  /  Alb  3.3  /  TBili  <0.2  /  DBili  x   /  AST  22  /  ALT  24  /  AlkPhos  61  03-25    06:40 - VBG - pH: 7.42  | pCO2: 52    | pO2: 66    | Lactate:        15:00 - VBG - pH: 7.41  | pCO2: 49    | pO2: 47    | Lactate: 2.6            Urinalysis Basic - ( 25 Mar 2024 06:19 )    Color: x / Appearance: x / SG: x / pH: x  Gluc: 134 mg/dL / Ketone: x  / Bili: x / Urobili: x   Blood: x / Protein: x / Nitrite: x   Leuk Esterase: x / RBC: x / WBC x   Sq Epi: x / Non Sq Epi: x / Bacteria: x        Culture - Blood (collected 23 Mar 2024 06:58)  Source: .Blood Blood-Peripheral  Preliminary Report (24 Mar 2024 11:01):    No growth at 24 hours    Culture - Blood (collected 23 Mar 2024 06:58)  Source: .Blood Blood-Venous  Preliminary Report (24 Mar 2024 11:01):    No growth at 24 hours      SARS-CoV-2: Sasha (22 Mar 2024 22:20)      RADIOLOGY & ADDITIONAL TESTS:  Imaging from Last 24 Hours: Medicine Progress Note      Patient is a 58y old  Male who presents with a chief complaint of Hypercapnic respiratory failure (24 Mar 2024 06:08)      SUBJECTIVE / OVERNIGHT EVENTS: CHRIS. This AM, patient seen and examined at bedside. Patient this AM still endorsing some shortness of breath when he walks. Feels abdomen is still distended. Tolerating PO. No chest pain.      MEDICATIONS  (STANDING):  albuterol/ipratropium for Nebulization 3 milliLiter(s) Nebulizer every 4 hours  atorvastatin 10 milliGRAM(s) Oral at bedtime  azithromycin  IVPB 500 milliGRAM(s) IV Intermittent every 24 hours  budesonide 160 MICROgram(s)/formoterol 4.5 MICROgram(s) Inhaler 2 Puff(s) Inhalation two times a day  cefTRIAXone   IVPB 1000 milliGRAM(s) IV Intermittent every 24 hours  enoxaparin Injectable 40 milliGRAM(s) SubCutaneous every 24 hours  famotidine    Tablet 40 milliGRAM(s) Oral daily  montelukast 10 milliGRAM(s) Oral daily  predniSONE   Tablet 40 milliGRAM(s) Oral daily    MEDICATIONS  (PRN):  acetaminophen     Tablet .. 650 milliGRAM(s) Oral every 6 hours PRN Temp greater or equal to 38C (100.4F), Mild Pain (1 - 3)  aluminum hydroxide/magnesium hydroxide/simethicone Suspension 30 milliLiter(s) Oral every 4 hours PRN Dyspepsia  melatonin 3 milliGRAM(s) Oral at bedtime PRN Insomnia  ondansetron Injectable 4 milliGRAM(s) IV Push every 8 hours PRN Nausea and/or Vomiting  polyethylene glycol 3350 17 Gram(s) Oral daily PRN Constipation  simethicone 80 milliGRAM(s) Chew daily PRN Indigestion    CAPILLARY BLOOD GLUCOSE        I&O's Summary      PHYSICAL EXAM:  Vital Signs Last 24 Hrs  T(C): 36.6 (25 Mar 2024 05:30), Max: 36.6 (25 Mar 2024 05:30)  T(F): 97.9 (25 Mar 2024 05:30), Max: 97.9 (25 Mar 2024 05:30)  HR: 92 (25 Mar 2024 05:30) (66 - 101)  BP: 107/62 (25 Mar 2024 05:30) (100/52 - 110/62)  BP(mean): --  RR: 18 (25 Mar 2024 05:30) (18 - 20)  SpO2: 98% (25 Mar 2024 05:30) (98% - 99%)    Parameters below as of 25 Mar 2024 05:30  Patient On (Oxygen Delivery Method): nasal cannula  O2 Flow (L/min): 2    CONSTITUTIONAL: NAD  HEENT: Moist oral mucosa  RESPIRATORY: Normal respiratory effort; lungs are clear to auscultation bilaterally  CARDIOVASCULAR: Regular rate and rhythm, normal S1 and S2, no murmur/rub/gallop, no lower extremity edema, peripheral pulses are palpable bilaterally  ABDOMEN: Soft, nondistended, nontender to palpation, normoactive bowel sounds, no rebound/guarding  PSYCH: A+O to person, place, and time  NEUROLOGY: Facial expression appears symmetric, no gross sensory deficits appreciated, moving all extremities spontaneously  SKIN: No rashes; no palpable lesions    LABS:                        12.7   8.83  )-----------( 303      ( 25 Mar 2024 06:19 )             39.3     03-25    139  |  98  |  15  ----------------------------<  134<H>  4.0   |  31  |  0.65    Ca    8.7      25 Mar 2024 06:19  Phos  2.8     03-25  Mg     2.20     03-25    TPro  6.9  /  Alb  3.3  /  TBili  <0.2  /  DBili  x   /  AST  22  /  ALT  24  /  AlkPhos  61  03-25    06:40 - VBG - pH: 7.42  | pCO2: 52    | pO2: 66    | Lactate:        15:00 - VBG - pH: 7.41  | pCO2: 49    | pO2: 47    | Lactate: 2.6            Urinalysis Basic - ( 25 Mar 2024 06:19 )    Color: x / Appearance: x / SG: x / pH: x  Gluc: 134 mg/dL / Ketone: x  / Bili: x / Urobili: x   Blood: x / Protein: x / Nitrite: x   Leuk Esterase: x / RBC: x / WBC x   Sq Epi: x / Non Sq Epi: x / Bacteria: x        Culture - Blood (collected 23 Mar 2024 06:58)  Source: .Blood Blood-Peripheral  Preliminary Report (24 Mar 2024 11:01):    No growth at 24 hours    Culture - Blood (collected 23 Mar 2024 06:58)  Source: .Blood Blood-Venous  Preliminary Report (24 Mar 2024 11:01):    No growth at 24 hours      SARS-CoV-2: NotDete (22 Mar 2024 22:20)      RADIOLOGY & ADDITIONAL TESTS:  Imaging from Last 24 Hours: Medicine Progress Note      Patient is a 58y old  Male who presents with a chief complaint of Hypercapnic respiratory failure (24 Mar 2024 06:08)      SUBJECTIVE / OVERNIGHT EVENTS: CHRIS. This AM, patient seen and examined at bedside. Patient this AM still endorsing some shortness of breath when he walks. Feels abdomen is still distended. Tolerating PO. No chest pain.       MEDICATIONS  (STANDING):  albuterol/ipratropium for Nebulization 3 milliLiter(s) Nebulizer every 4 hours  atorvastatin 10 milliGRAM(s) Oral at bedtime  azithromycin  IVPB 500 milliGRAM(s) IV Intermittent every 24 hours  budesonide 160 MICROgram(s)/formoterol 4.5 MICROgram(s) Inhaler 2 Puff(s) Inhalation two times a day  cefTRIAXone   IVPB 1000 milliGRAM(s) IV Intermittent every 24 hours  enoxaparin Injectable 40 milliGRAM(s) SubCutaneous every 24 hours  famotidine    Tablet 40 milliGRAM(s) Oral daily  montelukast 10 milliGRAM(s) Oral daily  predniSONE   Tablet 40 milliGRAM(s) Oral daily    MEDICATIONS  (PRN):  acetaminophen     Tablet .. 650 milliGRAM(s) Oral every 6 hours PRN Temp greater or equal to 38C (100.4F), Mild Pain (1 - 3)  aluminum hydroxide/magnesium hydroxide/simethicone Suspension 30 milliLiter(s) Oral every 4 hours PRN Dyspepsia  melatonin 3 milliGRAM(s) Oral at bedtime PRN Insomnia  ondansetron Injectable 4 milliGRAM(s) IV Push every 8 hours PRN Nausea and/or Vomiting  polyethylene glycol 3350 17 Gram(s) Oral daily PRN Constipation  simethicone 80 milliGRAM(s) Chew daily PRN Indigestion    CAPILLARY BLOOD GLUCOSE        I&O's Summary      PHYSICAL EXAM:  Vital Signs Last 24 Hrs  T(C): 36.6 (25 Mar 2024 05:30), Max: 36.6 (25 Mar 2024 05:30)  T(F): 97.9 (25 Mar 2024 05:30), Max: 97.9 (25 Mar 2024 05:30)  HR: 92 (25 Mar 2024 05:30) (66 - 101)  BP: 107/62 (25 Mar 2024 05:30) (100/52 - 110/62)  BP(mean): --  RR: 18 (25 Mar 2024 05:30) (18 - 20)  SpO2: 98% (25 Mar 2024 05:30) (98% - 99%)    Parameters below as of 25 Mar 2024 05:30  Patient On (Oxygen Delivery Method): nasal cannula  O2 Flow (L/min): 2    CONSTITUTIONAL: NAD  HEENT: Moist oral mucosa  RESPIRATORY: Normal respiratory effort; lungs are clear to auscultation bilaterally  CARDIOVASCULAR: Regular rate and rhythm, normal S1 and S2, no murmur/rub/gallop, no lower extremity edema, peripheral pulses are palpable bilaterally  ABDOMEN: Soft, nondistended, nontender to palpation, normoactive bowel sounds, no rebound/guarding  PSYCH: A+O to person, place, and time  NEUROLOGY: Facial expression appears symmetric, no gross sensory deficits appreciated, moving all extremities spontaneously  SKIN: No rashes; no palpable lesions    LABS:                        12.7   8.83  )-----------( 303      ( 25 Mar 2024 06:19 )             39.3     03-25    139  |  98  |  15  ----------------------------<  134<H>  4.0   |  31  |  0.65    Ca    8.7      25 Mar 2024 06:19  Phos  2.8     03-25  Mg     2.20     03-25    TPro  6.9  /  Alb  3.3  /  TBili  <0.2  /  DBili  x   /  AST  22  /  ALT  24  /  AlkPhos  61  03-25    06:40 - VBG - pH: 7.42  | pCO2: 52    | pO2: 66    | Lactate:        15:00 - VBG - pH: 7.41  | pCO2: 49    | pO2: 47    | Lactate: 2.6            Urinalysis Basic - ( 25 Mar 2024 06:19 )    Color: x / Appearance: x / SG: x / pH: x  Gluc: 134 mg/dL / Ketone: x  / Bili: x / Urobili: x   Blood: x / Protein: x / Nitrite: x   Leuk Esterase: x / RBC: x / WBC x   Sq Epi: x / Non Sq Epi: x / Bacteria: x        Culture - Blood (collected 23 Mar 2024 06:58)  Source: .Blood Blood-Peripheral  Preliminary Report (24 Mar 2024 11:01):    No growth at 24 hours    Culture - Blood (collected 23 Mar 2024 06:58)  Source: .Blood Blood-Venous  Preliminary Report (24 Mar 2024 11:01):    No growth at 24 hours      SARS-CoV-2: NotDete (22 Mar 2024 22:20)      RADIOLOGY & ADDITIONAL TESTS:  Imaging from Last 24 Hours: Medicine Progress Note      Patient is a 58y old  Male who presents with a chief complaint of Hypercapnic respiratory failure (24 Mar 2024 06:08)      SUBJECTIVE / OVERNIGHT EVENTS: CHRIS. This AM, patient seen and examined at bedside. Patient this AM still endorsing some shortness of breath when he walks. Feels abdomen is still distended. Tolerating PO. No chest pain.      MEDICATIONS  (STANDING):  albuterol/ipratropium for Nebulization 3 milliLiter(s) Nebulizer every 4 hours  atorvastatin 10 milliGRAM(s) Oral at bedtime  azithromycin  IVPB 500 milliGRAM(s) IV Intermittent every 24 hours  budesonide 160 MICROgram(s)/formoterol 4.5 MICROgram(s) Inhaler 2 Puff(s) Inhalation two times a day  cefTRIAXone   IVPB 1000 milliGRAM(s) IV Intermittent every 24 hours  enoxaparin Injectable 40 milliGRAM(s) SubCutaneous every 24 hours  famotidine    Tablet 40 milliGRAM(s) Oral daily  montelukast 10 milliGRAM(s) Oral daily  predniSONE   Tablet 40 milliGRAM(s) Oral daily    MEDICATIONS  (PRN):  acetaminophen     Tablet .. 650 milliGRAM(s) Oral every 6 hours PRN Temp greater or equal to 38C (100.4F), Mild Pain (1 - 3)  aluminum hydroxide/magnesium hydroxide/simethicone Suspension 30 milliLiter(s) Oral every 4 hours PRN Dyspepsia  melatonin 3 milliGRAM(s) Oral at bedtime PRN Insomnia  ondansetron Injectable 4 milliGRAM(s) IV Push every 8 hours PRN Nausea and/or Vomiting  polyethylene glycol 3350 17 Gram(s) Oral daily PRN Constipation  simethicone 80 milliGRAM(s) Chew daily PRN Indigestion    CAPILLARY BLOOD GLUCOSE        I&O's Summary      PHYSICAL EXAM:  Vital Signs Last 24 Hrs  T(C): 36.6 (25 Mar 2024 05:30), Max: 36.6 (25 Mar 2024 05:30)  T(F): 97.9 (25 Mar 2024 05:30), Max: 97.9 (25 Mar 2024 05:30)  HR: 92 (25 Mar 2024 05:30) (66 - 101)  BP: 107/62 (25 Mar 2024 05:30) (100/52 - 110/62)  BP(mean): --  RR: 18 (25 Mar 2024 05:30) (18 - 20)  SpO2: 98% (25 Mar 2024 05:30) (98% - 99%)    Parameters below as of 25 Mar 2024 05:30  Patient On (Oxygen Delivery Method): nasal cannula  O2 Flow (L/min): 2    CONSTITUTIONAL: NAD  HEENT: Moist oral mucosa  RESPIRATORY: Normal respiratory effort; lungs with trace expiratory wheeze on auscultation bilaterally. Worse after ambulation  CARDIOVASCULAR: Regular rate and rhythm, normal S1 and S2, no murmur/rub/gallop, no lower extremity edema, peripheral pulses are palpable bilaterally  ABDOMEN: Soft, distended, nontender to palpation, normoactive bowel sounds, no rebound/guarding  PSYCH: A+O to person, place, and time  NEUROLOGY: Facial expression appears symmetric, no gross sensory deficits appreciated, moving all extremities spontaneously  SKIN: No rashes; no palpable lesions    LABS:                        12.7   8.83  )-----------( 303      ( 25 Mar 2024 06:19 )             39.3     03-25    139  |  98  |  15  ----------------------------<  134<H>  4.0   |  31  |  0.65    Ca    8.7      25 Mar 2024 06:19  Phos  2.8     03-25  Mg     2.20     03-25    TPro  6.9  /  Alb  3.3  /  TBili  <0.2  /  DBili  x   /  AST  22  /  ALT  24  /  AlkPhos  61  03-25    06:40 - VBG - pH: 7.42  | pCO2: 52    | pO2: 66    | Lactate:        15:00 - VBG - pH: 7.41  | pCO2: 49    | pO2: 47    | Lactate: 2.6            Urinalysis Basic - ( 25 Mar 2024 06:19 )    Color: x / Appearance: x / SG: x / pH: x  Gluc: 134 mg/dL / Ketone: x  / Bili: x / Urobili: x   Blood: x / Protein: x / Nitrite: x   Leuk Esterase: x / RBC: x / WBC x   Sq Epi: x / Non Sq Epi: x / Bacteria: x        Culture - Blood (collected 23 Mar 2024 06:58)  Source: .Blood Blood-Peripheral  Preliminary Report (24 Mar 2024 11:01):    No growth at 24 hours    Culture - Blood (collected 23 Mar 2024 06:58)  Source: .Blood Blood-Venous  Preliminary Report (24 Mar 2024 11:01):    No growth at 24 hours      SARS-CoV-2: NotDetec (22 Mar 2024 22:20)      RADIOLOGY & ADDITIONAL TESTS:  Imaging from Last 24 Hours:

## 2024-03-25 NOTE — PROGRESS NOTE ADULT - PROBLEM SELECTOR PLAN 3
Pt follows with Dr. Kevon Acuña, Pulm/Crit. He is on Spiriva, Fluticasone, Montelukast 10mg daily. Not on home O2 as of now. Likely due to chronic cigarette smoking, has quit now  - Continue symbicort and duonebs  - Management of COPD exacerbation as above  - Consider flu vaccine here  - Consider Pneumococcal vaccine if has not had

## 2024-03-25 NOTE — PROGRESS NOTE ADULT - PROBLEM SELECTOR PLAN 2
Endorses 1-2 day history of abdominal distension  Abdominal XR without any signs cocerning for free air or stool burden  Patient having regular bowel movements  Reports feeling bloated    Plan:  Simethicone PRN

## 2024-03-25 NOTE — PROGRESS NOTE ADULT - PROBLEM SELECTOR PLAN 1
Pt p/w worsening dyspnea, with reported O2 sat 70s on RA. Has a known hx of COPD, is not on home oxygen. VBG shows hypercarbia (60) with HCO3- of  32 on VBG, indicating chronic hypercapnia to an extent. Repeat VBG (looks like an ABG based on O2%) shows PCO2 down to 49 on BiPAP. C/f Acute Hypoxic Hypercapnic Respiratory Failure from COPD exacerbation: has dyspnea, increased sputum production and cough.  - c/f COPD exacerbation: unclear source at this time. No consolidation on CXR, RVP negative  - management: Antibiotics, Bronchodilators, Corticosteroids  - Continue with coverage for CAP ( CTX + Azithro), 5d treatment. Follow up strep pneumo and urine legionella  - Continue bronchodilator: DuoNebs q4h for standing for now; Symbicort 160/4.5 2 puffs BID  - Continue steroids: Hydrocortisone 60mg x2 doses on 3/23. PO prednisone 40mg daily for 4 additional days to finish  - Weaned off BiPAP to nasal cannula. Continue nasal cannula and wean as tolerated  - Will pursue ambulatory saturations on 3/24 or 3/25 Pt p/w worsening dyspnea, with reported O2 sat 70s on RA. Has a known hx of COPD, is not on home oxygen. VBG shows hypercarbia (60) with HCO3- of  32 on VBG, indicating chronic hypercapnia to an extent. Repeat VBG (looks like an ABG based on O2%) shows PCO2 down to 49 on BiPAP. C/f Acute Hypoxic Hypercapnic Respiratory Failure from COPD exacerbation: has dyspnea, increased sputum production and cough.  - c/f COPD exacerbation: unclear source at this time. No consolidation on CXR, RVP negative  - management: Antibiotics, Bronchodilators, Corticosteroids  - Continue with coverage for CAP ( CTX + Azithro). Follow up strep pneumo and urine legionella. Less likely to have PNA, but will continue until DC, likely 3/26  - Continue bronchodilator: DuoNebs q4h for standing for now; Symbicort 160/4.5 2 puffs BID  - Continue steroids: Now on PO 40mg prednisone. Given response so far will pursue longer than 5 day total course with a taper. Plan for decrease by 10mg every 3 days. Can be extended as outpatient as needed based on continued response  - Ambulatory saturation as low as 90% on room air on 3/25  - Plan for pulm follow up on DC

## 2024-03-26 ENCOUNTER — TRANSCRIPTION ENCOUNTER (OUTPATIENT)
Age: 59
End: 2024-03-26

## 2024-03-26 PROBLEM — J44.1 CHRONIC OBSTRUCTIVE PULMONARY DISEASE WITH (ACUTE) EXACERBATION: Chronic | Status: ACTIVE | Noted: 2024-03-22

## 2024-03-26 PROBLEM — E78.5 HYPERLIPIDEMIA, UNSPECIFIED: Chronic | Status: ACTIVE | Noted: 2024-03-22

## 2024-03-26 LAB
ANION GAP SERPL CALC-SCNC: 10 MMOL/L — SIGNIFICANT CHANGE UP (ref 7–14)
BUN SERPL-MCNC: 12 MG/DL — SIGNIFICANT CHANGE UP (ref 7–23)
CALCIUM SERPL-MCNC: 9.2 MG/DL — SIGNIFICANT CHANGE UP (ref 8.4–10.5)
CHLORIDE SERPL-SCNC: 101 MMOL/L — SIGNIFICANT CHANGE UP (ref 98–107)
CO2 SERPL-SCNC: 28 MMOL/L — SIGNIFICANT CHANGE UP (ref 22–31)
CREAT SERPL-MCNC: 0.65 MG/DL — SIGNIFICANT CHANGE UP (ref 0.5–1.3)
EGFR: 110 ML/MIN/1.73M2 — SIGNIFICANT CHANGE UP
GLUCOSE SERPL-MCNC: 122 MG/DL — HIGH (ref 70–99)
HCT VFR BLD CALC: 43.7 % — SIGNIFICANT CHANGE UP (ref 39–50)
HGB BLD-MCNC: 14.3 G/DL — SIGNIFICANT CHANGE UP (ref 13–17)
LEGIONELLA AG UR QL: NEGATIVE — SIGNIFICANT CHANGE UP
MAGNESIUM SERPL-MCNC: 2.4 MG/DL — SIGNIFICANT CHANGE UP (ref 1.6–2.6)
MCHC RBC-ENTMCNC: 29.1 PG — SIGNIFICANT CHANGE UP (ref 27–34)
MCHC RBC-ENTMCNC: 32.7 GM/DL — SIGNIFICANT CHANGE UP (ref 32–36)
MCV RBC AUTO: 88.8 FL — SIGNIFICANT CHANGE UP (ref 80–100)
NRBC # BLD: 0 /100 WBCS — SIGNIFICANT CHANGE UP (ref 0–0)
NRBC # FLD: 0 K/UL — SIGNIFICANT CHANGE UP (ref 0–0)
PHOSPHATE SERPL-MCNC: 2.8 MG/DL — SIGNIFICANT CHANGE UP (ref 2.5–4.5)
PLATELET # BLD AUTO: 347 K/UL — SIGNIFICANT CHANGE UP (ref 150–400)
POTASSIUM SERPL-MCNC: 4.1 MMOL/L — SIGNIFICANT CHANGE UP (ref 3.5–5.3)
POTASSIUM SERPL-SCNC: 4.1 MMOL/L — SIGNIFICANT CHANGE UP (ref 3.5–5.3)
RBC # BLD: 4.92 M/UL — SIGNIFICANT CHANGE UP (ref 4.2–5.8)
RBC # FLD: 12.9 % — SIGNIFICANT CHANGE UP (ref 10.3–14.5)
S PNEUM AG UR QL: NEGATIVE — SIGNIFICANT CHANGE UP
SODIUM SERPL-SCNC: 139 MMOL/L — SIGNIFICANT CHANGE UP (ref 135–145)
WBC # BLD: 9.34 K/UL — SIGNIFICANT CHANGE UP (ref 3.8–10.5)
WBC # FLD AUTO: 9.34 K/UL — SIGNIFICANT CHANGE UP (ref 3.8–10.5)

## 2024-03-26 PROCEDURE — 99232 SBSQ HOSP IP/OBS MODERATE 35: CPT

## 2024-03-26 RX ADMIN — BUDESONIDE AND FORMOTEROL FUMARATE DIHYDRATE 2 PUFF(S): 160; 4.5 AEROSOL RESPIRATORY (INHALATION) at 08:45

## 2024-03-26 RX ADMIN — Medication 3 MILLILITER(S): at 17:10

## 2024-03-26 RX ADMIN — ENOXAPARIN SODIUM 40 MILLIGRAM(S): 100 INJECTION SUBCUTANEOUS at 22:32

## 2024-03-26 RX ADMIN — ATORVASTATIN CALCIUM 10 MILLIGRAM(S): 80 TABLET, FILM COATED ORAL at 22:33

## 2024-03-26 RX ADMIN — BUDESONIDE AND FORMOTEROL FUMARATE DIHYDRATE 2 PUFF(S): 160; 4.5 AEROSOL RESPIRATORY (INHALATION) at 22:14

## 2024-03-26 RX ADMIN — Medication 40 MILLIGRAM(S): at 06:31

## 2024-03-26 RX ADMIN — Medication 3 MILLILITER(S): at 09:36

## 2024-03-26 RX ADMIN — MONTELUKAST 10 MILLIGRAM(S): 4 TABLET, CHEWABLE ORAL at 12:00

## 2024-03-26 RX ADMIN — FAMOTIDINE 40 MILLIGRAM(S): 10 INJECTION INTRAVENOUS at 12:00

## 2024-03-26 RX ADMIN — Medication 3 MILLILITER(S): at 05:09

## 2024-03-26 RX ADMIN — Medication 3 MILLILITER(S): at 20:18

## 2024-03-26 RX ADMIN — AZITHROMYCIN 255 MILLIGRAM(S): 500 TABLET, FILM COATED ORAL at 22:14

## 2024-03-26 NOTE — PROGRESS NOTE ADULT - PROBLEM SELECTOR PLAN 5
DVT PPx: lovenox 40mg qd  Diet: DASH/TLC  Ethics: full code  Dispo: pending clinical improvement DVT PPx: lovenox 40mg qd  Diet: DASH/TLC  Ethics: full code  Dispo: Likely DC home tomorrow

## 2024-03-26 NOTE — PROGRESS NOTE ADULT - SUBJECTIVE AND OBJECTIVE BOX
Medicine Progress Note      Patient is a 58y old  Male who presents with a chief complaint of Hypercapnic respiratory failure (25 Mar 2024 07:56)      SUBJECTIVE / OVERNIGHT EVENTS: This AM, patient seen and examined at bedside.      MEDICATIONS  (STANDING):  albuterol/ipratropium for Nebulization 3 milliLiter(s) Nebulizer every 4 hours  atorvastatin 10 milliGRAM(s) Oral at bedtime  azithromycin  IVPB 500 milliGRAM(s) IV Intermittent every 24 hours  budesonide 160 MICROgram(s)/formoterol 4.5 MICROgram(s) Inhaler 2 Puff(s) Inhalation two times a day  cefTRIAXone   IVPB 1000 milliGRAM(s) IV Intermittent every 24 hours  enoxaparin Injectable 40 milliGRAM(s) SubCutaneous every 24 hours  famotidine    Tablet 40 milliGRAM(s) Oral daily  montelukast 10 milliGRAM(s) Oral daily  predniSONE   Tablet 40 milliGRAM(s) Oral daily  senna 2 Tablet(s) Oral at bedtime    MEDICATIONS  (PRN):  acetaminophen     Tablet .. 650 milliGRAM(s) Oral every 6 hours PRN Temp greater or equal to 38C (100.4F), Mild Pain (1 - 3)  aluminum hydroxide/magnesium hydroxide/simethicone Suspension 30 milliLiter(s) Oral every 4 hours PRN Dyspepsia  melatonin 3 milliGRAM(s) Oral at bedtime PRN Insomnia  ondansetron Injectable 4 milliGRAM(s) IV Push every 8 hours PRN Nausea and/or Vomiting  polyethylene glycol 3350 17 Gram(s) Oral daily PRN Constipation  simethicone 80 milliGRAM(s) Chew daily PRN Indigestion    CAPILLARY BLOOD GLUCOSE        I&O's Summary    25 Mar 2024 07:01  -  26 Mar 2024 07:00  --------------------------------------------------------  IN: 0 mL / OUT: 750 mL / NET: -750 mL        PHYSICAL EXAM:  Vital Signs Last 24 Hrs  T(C): 36.7 (26 Mar 2024 06:03), Max: 36.7 (25 Mar 2024 14:00)  T(F): 98 (26 Mar 2024 06:03), Max: 98.1 (25 Mar 2024 14:00)  HR: 75 (26 Mar 2024 06:03) (75 - 107)  BP: 113/73 (26 Mar 2024 06:03) (106/66 - 113/73)  BP(mean): --  RR: 18 (26 Mar 2024 06:03) (18 - 20)  SpO2: 96% (26 Mar 2024 06:03) (92% - 97%)    Parameters below as of 25 Mar 2024 20:59  Patient On (Oxygen Delivery Method): room air      CONSTITUTIONAL: NAD  HEENT: Moist oral mucosa  RESPIRATORY: Normal respiratory effort; lungs are clear to auscultation bilaterally  CARDIOVASCULAR: Regular rate and rhythm, normal S1 and S2, no murmur/rub/gallop, no lower extremity edema, peripheral pulses are palpable bilaterally  ABDOMEN: Soft, nondistended, nontender to palpation, normoactive bowel sounds, no rebound/guarding  PSYCH: A+O to person, place, and time  NEUROLOGY: Facial expression appears symmetric, no gross sensory deficits appreciated, moving all extremities spontaneously  SKIN: No rashes; no palpable lesions    LABS:                        14.3   9.34  )-----------( 347      ( 26 Mar 2024 06:52 )             43.7     03-25    139  |  98  |  15  ----------------------------<  134<H>  4.0   |  31  |  0.65    Ca    8.7      25 Mar 2024 06:19  Phos  2.8     03-25  Mg     2.20     03-25    TPro  6.9  /  Alb  3.3  /  TBili  <0.2  /  DBili  x   /  AST  22  /  ALT  24  /  AlkPhos  61  03-25            Urinalysis Basic - ( 25 Mar 2024 06:19 )    Color: x / Appearance: x / SG: x / pH: x  Gluc: 134 mg/dL / Ketone: x  / Bili: x / Urobili: x   Blood: x / Protein: x / Nitrite: x   Leuk Esterase: x / RBC: x / WBC x   Sq Epi: x / Non Sq Epi: x / Bacteria: x        SARS-CoV-2: NotDetec (22 Mar 2024 22:20)      RADIOLOGY & ADDITIONAL TESTS:  Imaging from Last 24 Hours: Medicine Progress Note      Patient is a 58y old  Male who presents with a chief complaint of Hypercapnic respiratory failure (25 Mar 2024 07:56)      SUBJECTIVE / OVERNIGHT EVENTS: CHRIS. This AM, patient seen and examined at bedside. Still with difficulty breathing on exertion. No chest pain, tolerating PO diet.      MEDICATIONS  (STANDING):  albuterol/ipratropium for Nebulization 3 milliLiter(s) Nebulizer every 4 hours  atorvastatin 10 milliGRAM(s) Oral at bedtime  azithromycin  IVPB 500 milliGRAM(s) IV Intermittent every 24 hours  budesonide 160 MICROgram(s)/formoterol 4.5 MICROgram(s) Inhaler 2 Puff(s) Inhalation two times a day  cefTRIAXone   IVPB 1000 milliGRAM(s) IV Intermittent every 24 hours  enoxaparin Injectable 40 milliGRAM(s) SubCutaneous every 24 hours  famotidine    Tablet 40 milliGRAM(s) Oral daily  montelukast 10 milliGRAM(s) Oral daily  predniSONE   Tablet 40 milliGRAM(s) Oral daily  senna 2 Tablet(s) Oral at bedtime    MEDICATIONS  (PRN):  acetaminophen     Tablet .. 650 milliGRAM(s) Oral every 6 hours PRN Temp greater or equal to 38C (100.4F), Mild Pain (1 - 3)  aluminum hydroxide/magnesium hydroxide/simethicone Suspension 30 milliLiter(s) Oral every 4 hours PRN Dyspepsia  melatonin 3 milliGRAM(s) Oral at bedtime PRN Insomnia  ondansetron Injectable 4 milliGRAM(s) IV Push every 8 hours PRN Nausea and/or Vomiting  polyethylene glycol 3350 17 Gram(s) Oral daily PRN Constipation  simethicone 80 milliGRAM(s) Chew daily PRN Indigestion    CAPILLARY BLOOD GLUCOSE        I&O's Summary    25 Mar 2024 07:01  -  26 Mar 2024 07:00  --------------------------------------------------------  IN: 0 mL / OUT: 750 mL / NET: -750 mL        PHYSICAL EXAM:  Vital Signs Last 24 Hrs  T(C): 36.7 (26 Mar 2024 06:03), Max: 36.7 (25 Mar 2024 14:00)  T(F): 98 (26 Mar 2024 06:03), Max: 98.1 (25 Mar 2024 14:00)  HR: 75 (26 Mar 2024 06:03) (75 - 107)  BP: 113/73 (26 Mar 2024 06:03) (106/66 - 113/73)  BP(mean): --  RR: 18 (26 Mar 2024 06:03) (18 - 20)  SpO2: 96% (26 Mar 2024 06:03) (92% - 97%)    Parameters below as of 25 Mar 2024 20:59  Patient On (Oxygen Delivery Method): room air      CONSTITUTIONAL: NAD  HEENT: Moist oral mucosa  RESPIRATORY: Normal respiratory effort; expiratory wheeze heard bilaterally still  CARDIOVASCULAR: Regular rate and rhythm, normal S1 and S2, no murmur/rub/gallop, no lower extremity edema, peripheral pulses are palpable bilaterally  ABDOMEN: Soft, distended, nontender to palpation, normoactive bowel sounds, no rebound/guarding  PSYCH: A+O to person, place, and time  NEUROLOGY: Facial expression appears symmetric, no gross sensory deficits appreciated, moving all extremities spontaneously  SKIN: No rashes; no palpable lesions    LABS:                        14.3   9.34  )-----------( 347      ( 26 Mar 2024 06:52 )             43.7     03-25    139  |  98  |  15  ----------------------------<  134<H>  4.0   |  31  |  0.65    Ca    8.7      25 Mar 2024 06:19  Phos  2.8     03-25  Mg     2.20     03-25    TPro  6.9  /  Alb  3.3  /  TBili  <0.2  /  DBili  x   /  AST  22  /  ALT  24  /  AlkPhos  61  03-25            Urinalysis Basic - ( 25 Mar 2024 06:19 )    Color: x / Appearance: x / SG: x / pH: x  Gluc: 134 mg/dL / Ketone: x  / Bili: x / Urobili: x   Blood: x / Protein: x / Nitrite: x   Leuk Esterase: x / RBC: x / WBC x   Sq Epi: x / Non Sq Epi: x / Bacteria: x        SARS-CoV-2: NotDetec (22 Mar 2024 22:20)      RADIOLOGY & ADDITIONAL TESTS:  Imaging from Last 24 Hours:

## 2024-03-26 NOTE — PROGRESS NOTE ADULT - PROBLEM SELECTOR PLAN 1
Quality 226: Preventive Care And Screening: Tobacco Use: Screening And Cessation Intervention: Patient screened for tobacco and is an ex-smoker Quality 110: Preventive Care And Screening: Influenza Immunization: Influenza Immunization Administered during Influenza season Quality 130: Documentation Of Current Medications In The Medical Record: Current Medications Documented Detail Level: Detailed Name And Contact Information For Health Care Proxy: Tamara Harrison, [phone] Quality 431: Preventive Care And Screening: Unhealthy Alcohol Use - Screening: Patient screened for unhealthy alcohol use using a single question and scores less than 2 times per year Quality 47: Advance Care Plan: Advance Care Planning discussed and documented; advance care plan or surrogate decision maker documented in the medical record. Pt p/w worsening dyspnea, with reported O2 sat 70s on RA. Has a known hx of COPD, is not on home oxygen. VBG shows hypercarbia (60) with HCO3- of  32 on VBG, indicating chronic hypercapnia to an extent. Repeat VBG (looks like an ABG based on O2%) shows PCO2 down to 49 on BiPAP. C/f Acute Hypoxic Hypercapnic Respiratory Failure from COPD exacerbation: has dyspnea, increased sputum production and cough.  - c/f COPD exacerbation: unclear source at this time. No consolidation on CXR, RVP negative  - management: Antibiotics, Bronchodilators, Corticosteroids  - Continue with coverage for CAP ( CTX + Azithro). Follow up strep pneumo and urine legionella. Less likely to have PNA, but will continue until DC, likely 3/26  - Continue bronchodilator: DuoNebs q4h for standing for now; Symbicort 160/4.5 2 puffs BID  - Continue steroids: Now on PO 40mg prednisone. Given response so far will pursue longer than 5 day total course with a taper. Plan for decrease by 10mg every 3 days. Can be extended as outpatient as needed based on continued response  - Ambulatory saturation as low as 90% on room air on 3/25  - Plan for pulm follow up on DC Quality 111:Pneumonia Vaccination Status For Older Adults: Pneumococcal Vaccination Previously Received

## 2024-03-26 NOTE — DISCHARGE NOTE PROVIDER - HOSPITAL COURSE
Mr. Peterson is 58-year-old male, past medical history of emphysema/COPD and hyperlipidemia, presents to ED for worsening shortness of breath associated with chest pain x 2 days, admitted for acute hypoxic hypercapnic respiratory failure likely due to COPD exacerbation. Showing clinical improvement, will plan for steroid taper.    Patient on presentation was noted to be short of breath. Was placed on BiPAP intially, given 125mg of methylprednisolone along with doses of ceftriaxone and azithro. He had improvement in his respiratory distress and improvement in his blood gas. He was able to be taken off the BiPAP and placed on nasal cannula 6L. He was continued on azithro and ceftriaxone empirically for CAP coverage. CXR at time of admission showed no obvious consolidations. RVP was negative. He received additional 120 of hydrocortisone and was eventually transitioned to prednisone 40mg PO with plan initially for 5 day total steroid course. During his admission he still had intermittent wheeze despite steroids, antibiotics, duonebs and home symbicort treatment. Received total of 5 days of antibiotics. Ambulatory saturation was recorded at 90% at the lowest on room air. Decision upon discharge was made to continue patient on prednisone taper with plan for 10mg decrease every 3 days. Patient was also scheduled for tele visit post hospital follow up with the pulmonary team for further monitoring. Patient medically stable for discharge home with established follow up with pulm and plan for recommended PCP follow up.    New medications to go home on:  Prednisone 30mg for 3 days, then prednisone 20mg for 3 days, then prednisone 10mg for 3 days.   Mr. Peterson is 58-year-old male, past medical history of emphysema/COPD and hyperlipidemia, presents to ED for worsening shortness of breath associated with chest pain x 2 days, admitted for acute hypoxic hypercapnic respiratory failure likely due to COPD exacerbation. Showing clinical improvement, will plan for steroid taper.    Patient on presentation was noted to be short of breath. Was placed on BiPAP intially, given 125mg of methylprednisolone along with doses of ceftriaxone and azithro. He had improvement in his respiratory distress and improvement in his blood gas. He was able to be taken off the BiPAP and placed on nasal cannula 6L. He was continued on azithro and ceftriaxone empirically for CAP coverage. CXR at time of admission showed no obvious consolidations. RVP was negative. He received additional 120 of hydrocortisone and was eventually transitioned to prednisone 40mg PO with plan initially for 5 day total steroid course. During his admission he still had intermittent wheeze despite steroids, antibiotics, duonebs and home symbicort treatment. Received total of 5 days of antibiotics. Ambulatory saturation was recorded at 90% at the lowest on room air. Decision upon discharge was made to continue patient on prednisone taper with plan for 10mg decrease every 3 days. Patient was also scheduled for tele visit post hospital follow up with the pulmonary team for further monitoring. Patient's respiratory status continued to improve and medically stable for discharge home with established follow up with pulm and plan for recommended PCP follow up.    New medications to go home on:  Prednisone 30mg for 3 days, then prednisone 20mg for 3 days, then prednisone 10mg for 3 days.

## 2024-03-26 NOTE — PROGRESS NOTE ADULT - PROBLEM SELECTOR PLAN 2
Endorses 1-2 day history of abdominal distension  Abdominal XR without any signs cocerning for free air or stool burden  Patient having regular bowel movements  Reports feeling bloated    Plan:  Simethicone PRN Endorses 1-2 day history of abdominal distension  Abdominal XR without any signs concerning for free air or stool burden  Patient having regular bowel movements  Reports feeling bloated    Plan:  Simethicone PRN

## 2024-03-26 NOTE — DISCHARGE NOTE PROVIDER - NSDCCPCAREPLAN_GEN_ALL_CORE_FT
PRINCIPAL DISCHARGE DIAGNOSIS  Diagnosis: Acute hypoxic on chronic hypercapnic respiratory failure  Assessment and Plan of Treatment: You presented to the hospital with difficulty breathing      SECONDARY DISCHARGE DIAGNOSES  Diagnosis: COPD exacerbation  Assessment and Plan of Treatment:      PRINCIPAL DISCHARGE DIAGNOSIS  Diagnosis: Acute hypoxic on chronic hypercapnic respiratory failure  Assessment and Plan of Treatment: You presented to the hospital with difficulty breathing. We believe this was due to an exacerbation of your known COPD. We checked you for infections and did not find any signs of an obvious bacterial or viral infection. You were initially on BiPAP to support your breathing, but were eventually able to be weaned down to nasal cannula. We treated you with antibiotics and also with steroids to help reduce potentialy inflammation. Over the course of your stay you got better. We set you up for an appointment with the pulmonary team via telehealth on 3/28 with Dr. Greco. Please make sure to attend this. We will also be sending you home with oral steroids. You will start taking 30mg daily for 3 days, then 20mg for 3 days, then 10mg for 3 days. These medications were sent to [facility]. Please also follow up with your primary care doctor when you leave the hospital      SECONDARY DISCHARGE DIAGNOSES  Diagnosis: COPD exacerbation  Assessment and Plan of Treatment: You presented to the hospital with difficulty breathing. We believe this was due to an exacerbation of your known COPD. We checked you for infections and did not find any signs of an obvious bacterial or viral infection. You were initially on BiPAP to support your breathing, but were eventually able to be weaned down to nasal cannula. We treated you with antibiotics and also with steroids to help reduce potentialy inflammation. Over the course of your stay you got better. We set you up for an appointment with the pulmonary team via telehealth on 3/28 with Dr. Greco. Please make sure to attend this. We will also be sending you home with oral steroids. You will start taking 30mg daily for 3 days, then 20mg for 3 days, then 10mg for 3 days. These medications were sent to [facility]. Please also follow up with your primary care doctor when you leave the hospital

## 2024-03-26 NOTE — DISCHARGE NOTE PROVIDER - NSDCFUSCHEDAPPT_GEN_ALL_CORE_FT
Rola Greco  [facility]  PULED [address] [place] [address]  Scheduled Appointment: 03/28/2024

## 2024-03-26 NOTE — DISCHARGE NOTE PROVIDER - NSDCMRMEDTOKEN_GEN_ALL_CORE_FT
atorvastatin 10 mg oral tablet: 1 tab(s) orally once a day (at bedtime)  famotidine 20 mg oral tablet: 1 tab(s) orally twice a day before breakfast and dinner  montelukast 10 mg oral tablet: 1 tab(s) orally once a day  Spiriva Respimat 10 ACT 2.5 mcg/inh inhalation aerosol: 2 puff(s) inhaled once a day  Symbicort 160 mcg-4.5 mcg/inh inhalation aerosol: 2 puff(s) inhaled 2 times a day   atorvastatin 10 mg oral tablet: 1 tab(s) orally once a day (at bedtime)  famotidine 20 mg oral tablet: 1 tab(s) orally twice a day before breakfast and dinner  montelukast 10 mg oral tablet: 1 tab(s) orally once a day  predniSONE 10 mg oral tablet: 1 tab(s) orally once a day Take 30mg (3 pills) on 3/28, 3/29, 3/20  Take 20mg (2 pills) on 3/31, 4/1, 4/2  Take 10mg (1 pill)  on 4/3, 4/4, 4/5  Spiriva Respimat 10 ACT 2.5 mcg/inh inhalation aerosol: 2 puff(s) inhaled once a day  Symbicort 160 mcg-4.5 mcg/inh inhalation aerosol: 2 puff(s) inhaled 2 times a day

## 2024-03-26 NOTE — DISCHARGE NOTE PROVIDER - CARE PROVIDER_API CALL
Jennifer Woods  Internal Medicine  [address], [address]  [place] [address]  Phone: [phone]  Fax: [phone]  Established Patient  Follow Up Time: 2 weeks

## 2024-03-26 NOTE — PROGRESS NOTE ADULT - TIME BILLING
Time spent on review of vitals, physical exam, documentation, and discussion of plan of care with patient and patient family.

## 2024-03-26 NOTE — PROGRESS NOTE ADULT - ASSESSMENT
Mr. Peterson is 58-year-old male, past medical history of emphysema/COPD and hyperlipidemia, presents to ED for worsening shortness of breath associated with chest pain x 2 days, admitted for acute hypoxic hypercapnic respiratory failure likely due to COPD exacerbation. Mr. Peterson is 58-year-old male, past medical history of emphysema/COPD and hyperlipidemia, presents to ED for worsening shortness of breath associated with chest pain x 2 days, admitted for acute hypoxic hypercapnic respiratory failure likely due to COPD exacerbation. Showing clinical improvement, will plan for steroid taper.

## 2024-03-27 ENCOUNTER — TRANSCRIPTION ENCOUNTER (OUTPATIENT)
Age: 59
End: 2024-03-27

## 2024-03-27 VITALS
OXYGEN SATURATION: 98 % | RESPIRATION RATE: 18 BRPM | TEMPERATURE: 98 F | HEART RATE: 97 BPM | DIASTOLIC BLOOD PRESSURE: 87 MMHG | SYSTOLIC BLOOD PRESSURE: 127 MMHG

## 2024-03-27 PROBLEM — Z00.00 ENCOUNTER FOR PREVENTIVE HEALTH EXAMINATION: Status: ACTIVE | Noted: 2024-03-27

## 2024-03-27 PROCEDURE — 99239 HOSP IP/OBS DSCHRG MGMT >30: CPT | Mod: GC

## 2024-03-27 RX ORDER — IPRATROPIUM/ALBUTEROL SULFATE 18-103MCG
3 AEROSOL WITH ADAPTER (GRAM) INHALATION EVERY 12 HOURS
Refills: 0 | Status: DISCONTINUED | OUTPATIENT
Start: 2024-03-27 | End: 2024-03-27

## 2024-03-27 RX ADMIN — MONTELUKAST 10 MILLIGRAM(S): 4 TABLET, CHEWABLE ORAL at 11:47

## 2024-03-27 RX ADMIN — BUDESONIDE AND FORMOTEROL FUMARATE DIHYDRATE 2 PUFF(S): 160; 4.5 AEROSOL RESPIRATORY (INHALATION) at 11:47

## 2024-03-27 RX ADMIN — FAMOTIDINE 40 MILLIGRAM(S): 10 INJECTION INTRAVENOUS at 11:48

## 2024-03-27 RX ADMIN — Medication 40 MILLIGRAM(S): at 05:36

## 2024-03-27 RX ADMIN — Medication 3 MILLILITER(S): at 08:25

## 2024-03-27 RX ADMIN — Medication 3 MILLILITER(S): at 00:59

## 2024-03-27 RX ADMIN — CEFTRIAXONE 100 MILLIGRAM(S): 500 INJECTION, POWDER, FOR SOLUTION INTRAMUSCULAR; INTRAVENOUS at 00:09

## 2024-03-27 NOTE — PROGRESS NOTE ADULT - PROBLEM SELECTOR PLAN 1
Pt p/w worsening dyspnea, with reported O2 sat 70s on RA. Has a known hx of COPD, is not on home oxygen. VBG shows hypercarbia (60) with HCO3- of  32 on VBG, indicating chronic hypercapnia to an extent. Repeat VBG (looks like an ABG based on O2%) shows PCO2 down to 49 on BiPAP. C/f Acute Hypoxic Hypercapnic Respiratory Failure from COPD exacerbation: has dyspnea, increased sputum production and cough.  - c/f COPD exacerbation: unclear source at this time. No consolidation on CXR, RVP negative  - management: Antibiotics, Bronchodilators, Corticosteroids  - Continue with coverage for CAP ( CTX + Azithro). Follow up strep pneumo and urine legionella. Less likely to have PNA, but will continue until DC, likely 3/26  - Continue bronchodilator: DuoNebs q4h for standing for now; Symbicort 160/4.5 2 puffs BID  - Continue steroids: Now on PO 40mg prednisone. Given response so far will pursue longer than 5 day total course with a taper. Plan for decrease by 10mg every 3 days. Can be extended as outpatient as needed based on continued response  - Ambulatory saturation as low as 90% on room air on 3/25  - Plan for pulm follow up on DC

## 2024-03-27 NOTE — PROGRESS NOTE ADULT - SUBJECTIVE AND OBJECTIVE BOX
Medicine Progress Note      Patient is a 58y old  Male who presents with a chief complaint of Hypercapnic respiratory failure (26 Mar 2024 16:11)      SUBJECTIVE / OVERNIGHT EVENTS: This AM, patient seen and examined at bedside.      MEDICATIONS  (STANDING):  albuterol/ipratropium for Nebulization 3 milliLiter(s) Nebulizer every 4 hours  atorvastatin 10 milliGRAM(s) Oral at bedtime  azithromycin  IVPB 500 milliGRAM(s) IV Intermittent every 24 hours  budesonide 160 MICROgram(s)/formoterol 4.5 MICROgram(s) Inhaler 2 Puff(s) Inhalation two times a day  cefTRIAXone   IVPB 1000 milliGRAM(s) IV Intermittent every 24 hours  enoxaparin Injectable 40 milliGRAM(s) SubCutaneous every 24 hours  famotidine    Tablet 40 milliGRAM(s) Oral daily  montelukast 10 milliGRAM(s) Oral daily  senna 2 Tablet(s) Oral at bedtime    MEDICATIONS  (PRN):  acetaminophen     Tablet .. 650 milliGRAM(s) Oral every 6 hours PRN Temp greater or equal to 38C (100.4F), Mild Pain (1 - 3)  aluminum hydroxide/magnesium hydroxide/simethicone Suspension 30 milliLiter(s) Oral every 4 hours PRN Dyspepsia  melatonin 3 milliGRAM(s) Oral at bedtime PRN Insomnia  ondansetron Injectable 4 milliGRAM(s) IV Push every 8 hours PRN Nausea and/or Vomiting  polyethylene glycol 3350 17 Gram(s) Oral daily PRN Constipation  simethicone 80 milliGRAM(s) Chew daily PRN Indigestion    CAPILLARY BLOOD GLUCOSE        I&O's Summary      PHYSICAL EXAM:  Vital Signs Last 24 Hrs  T(C): 36.7 (27 Mar 2024 05:36), Max: 37 (26 Mar 2024 22:16)  T(F): 98.1 (27 Mar 2024 05:36), Max: 98.6 (26 Mar 2024 22:16)  HR: 77 (27 Mar 2024 05:36) (74 - 98)  BP: 116/74 (27 Mar 2024 05:36) (101/86 - 137/85)  BP(mean): --  RR: 18 (27 Mar 2024 05:36) (18 - 18)  SpO2: 97% (27 Mar 2024 05:36) (97% - 100%)    Parameters below as of 27 Mar 2024 05:36  Patient On (Oxygen Delivery Method): room air      CONSTITUTIONAL: NAD  HEENT: Moist oral mucosa  RESPIRATORY: Normal respiratory effort; lungs are clear to auscultation bilaterally  CARDIOVASCULAR: Regular rate and rhythm, normal S1 and S2, no murmur/rub/gallop, no lower extremity edema, peripheral pulses are palpable bilaterally  ABDOMEN: Soft, nondistended, nontender to palpation, normoactive bowel sounds, no rebound/guarding  PSYCH: A+O to person, place, and time  NEUROLOGY: Facial expression appears symmetric, no gross sensory deficits appreciated, moving all extremities spontaneously  SKIN: No rashes; no palpable lesions    LABS:                        14.3   9.34  )-----------( 347      ( 26 Mar 2024 06:52 )             43.7     03-26    139  |  101  |  12  ----------------------------<  122<H>  4.1   |  28  |  0.65    Ca    9.2      26 Mar 2024 06:52  Phos  2.8     03-26  Mg     2.40     03-26              Urinalysis Basic - ( 26 Mar 2024 06:52 )    Color: x / Appearance: x / SG: x / pH: x  Gluc: 122 mg/dL / Ketone: x  / Bili: x / Urobili: x   Blood: x / Protein: x / Nitrite: x   Leuk Esterase: x / RBC: x / WBC x   Sq Epi: x / Non Sq Epi: x / Bacteria: x        SARS-CoV-2: NotDetec (22 Mar 2024 22:20)      RADIOLOGY & ADDITIONAL TESTS:  Imaging from Last 24 Hours: Medicine Progress Note      Patient is a 58y old  Male who presents with a chief complaint of Hypercapnic respiratory failure (26 Mar 2024 16:11)      SUBJECTIVE / OVERNIGHT EVENTS: CHRIS. This AM, patient seen and examined at bedside. Patient feeling much better. Still with some shortness of breath upon walking, but overall improved.      MEDICATIONS  (STANDING):  albuterol/ipratropium for Nebulization 3 milliLiter(s) Nebulizer every 4 hours  atorvastatin 10 milliGRAM(s) Oral at bedtime  azithromycin  IVPB 500 milliGRAM(s) IV Intermittent every 24 hours  budesonide 160 MICROgram(s)/formoterol 4.5 MICROgram(s) Inhaler 2 Puff(s) Inhalation two times a day  cefTRIAXone   IVPB 1000 milliGRAM(s) IV Intermittent every 24 hours  enoxaparin Injectable 40 milliGRAM(s) SubCutaneous every 24 hours  famotidine    Tablet 40 milliGRAM(s) Oral daily  montelukast 10 milliGRAM(s) Oral daily  senna 2 Tablet(s) Oral at bedtime    MEDICATIONS  (PRN):  acetaminophen     Tablet .. 650 milliGRAM(s) Oral every 6 hours PRN Temp greater or equal to 38C (100.4F), Mild Pain (1 - 3)  aluminum hydroxide/magnesium hydroxide/simethicone Suspension 30 milliLiter(s) Oral every 4 hours PRN Dyspepsia  melatonin 3 milliGRAM(s) Oral at bedtime PRN Insomnia  ondansetron Injectable 4 milliGRAM(s) IV Push every 8 hours PRN Nausea and/or Vomiting  polyethylene glycol 3350 17 Gram(s) Oral daily PRN Constipation  simethicone 80 milliGRAM(s) Chew daily PRN Indigestion    CAPILLARY BLOOD GLUCOSE        I&O's Summary      PHYSICAL EXAM:  Vital Signs Last 24 Hrs  T(C): 36.7 (27 Mar 2024 05:36), Max: 37 (26 Mar 2024 22:16)  T(F): 98.1 (27 Mar 2024 05:36), Max: 98.6 (26 Mar 2024 22:16)  HR: 77 (27 Mar 2024 05:36) (74 - 98)  BP: 116/74 (27 Mar 2024 05:36) (101/86 - 137/85)  BP(mean): --  RR: 18 (27 Mar 2024 05:36) (18 - 18)  SpO2: 97% (27 Mar 2024 05:36) (97% - 100%)    Parameters below as of 27 Mar 2024 05:36  Patient On (Oxygen Delivery Method): room air    CONSTITUTIONAL: NAD  HEENT: Moist oral mucosa  RESPIRATORY: Normal respiratory effort; trace expiratory wheeze heard bilaterally still  CARDIOVASCULAR: Regular rate and rhythm, normal S1 and S2, no murmur/rub/gallop, no lower extremity edema, peripheral pulses are palpable bilaterally  ABDOMEN: Soft, distended, nontender to palpation, normoactive bowel sounds, no rebound/guarding  PSYCH: A+O to person, place, and time  NEUROLOGY: Facial expression appears symmetric, no gross sensory deficits appreciated, moving all extremities spontaneously  SKIN: No rashes; no palpable lesions    LABS:                        14.3   9.34  )-----------( 347      ( 26 Mar 2024 06:52 )             43.7     03-26    139  |  101  |  12  ----------------------------<  122<H>  4.1   |  28  |  0.65    Ca    9.2      26 Mar 2024 06:52  Phos  2.8     03-26  Mg     2.40     03-26              Urinalysis Basic - ( 26 Mar 2024 06:52 )    Color: x / Appearance: x / SG: x / pH: x  Gluc: 122 mg/dL / Ketone: x  / Bili: x / Urobili: x   Blood: x / Protein: x / Nitrite: x   Leuk Esterase: x / RBC: x / WBC x   Sq Epi: x / Non Sq Epi: x / Bacteria: x        SARS-CoV-2: NotDetec (22 Mar 2024 22:20)      RADIOLOGY & ADDITIONAL TESTS:  Imaging from Last 24 Hours:

## 2024-03-27 NOTE — PROGRESS NOTE ADULT - ASSESSMENT
Mr. Peterson is 58-year-old male, past medical history of emphysema/COPD and hyperlipidemia, presents to ED for worsening shortness of breath associated with chest pain x 2 days, admitted for acute hypoxic hypercapnic respiratory failure likely due to COPD exacerbation. Showing clinical improvement, will plan for steroid taper.

## 2024-03-27 NOTE — DISCHARGE NOTE NURSING/CASE MANAGEMENT/SOCIAL WORK - PATIENT PORTAL LINK FT
You can access the FollowMyHealth Patient Portal offered by [facility] by registering at the following website: [email or web address]. By joining [employer]’s FollowMyHealth portal, you will also be able to view your health information using other applications (apps) compatible with our system.

## 2024-03-27 NOTE — PROGRESS NOTE ADULT - REASON FOR ADMISSION
Hypercapnic respiratory failure

## 2024-03-27 NOTE — PROGRESS NOTE ADULT - PROBLEM SELECTOR PLAN 5
DVT PPx: lovenox 40mg qd  Diet: DASH/TLC  Ethics: full code  Dispo: Likely DC home tomorrow DVT PPx: lovenox 40mg qd  Diet: DASH/TLC  Ethics: full code  Dispo: DC home today 3/27

## 2024-03-27 NOTE — MEDICAL STUDENT PROGRESS NOTE(EDUCATION) - ASSESSMENT
· Assessment	  Mr. Peterson is 58-year-old male, past medical history of emphysema/COPD and hyperlipidemia, presents to ED for worsening shortness of breath associated with chest pain x 2 days, admitted for acute hypoxic hypercapnic respiratory failure likely due to COPD exacerbation. Showing clinical improvement, will plan for steroid taper.       Problem/Plan - 1:  ·  Problem: Acute hypoxic on chronic hypercapnic respiratory failure.   ·  Plan: Pt p/w worsening dyspnea, with reported O2 sat 70s on RA. Has a known hx of COPD, is not on home oxygen. VBG shows hypercarbia (60) with HCO3- of  32 on VBG, indicating chronic hypercapnia to an extent. Repeat VBG (looks like an ABG based on O2%) shows PCO2 down to 49 on BiPAP. C/f Acute Hypoxic Hypercapnic Respiratory Failure from COPD exacerbation: has dyspnea, increased sputum production and cough.  - c/f COPD exacerbation: unclear source at this time. No consolidation on CXR, RVP negative  - management: Antibiotics, Bronchodilators, Corticosteroids  - Continue with coverage for CAP ( CTX + Azithro). Follow up strep pneumo and urine legionella. Less likely to have PNA, but will continue until DC, likely 3/26  - Continue bronchodilator: DuoNebs q4h for standing for now; Symbicort 160/4.5 2 puffs BID  - Continue steroids: Now on PO 40mg prednisone. Given response so far will pursue longer than 5 day total course with a taper. Plan for decrease by 10mg every 3 days. Can be extended as outpatient as needed based on continued response  - Ambulatory saturation as low as 90% on room air on 3/25  -Pulm follow up on DC.     Problem/Plan - 2:  ·  Problem: Abdominal distension.   ·  Plan: Endorses 1-2 day history of abdominal distension  Abdominal XR without any signs concerning for free air or stool burden  Patient having regular bowel movements  Reports feeling bloated    Plan:  Simethicone PRN.     Problem/Plan - 3:  ·  Problem: History of COPD.   ·  Plan: Pt follows with Dr. Kevon Acuña, Pulm/Crit. He is on Spiriva, Fluticasone, Montelukast 10mg daily. Not on home O2 as of now. Likely due to chronic cigarette smoking, has quit now  - Continue symbicort and duonebs  - Management of COPD exacerbation as above  - Consider flu vaccine here  - Consider Pneumococcal vaccine if has not had.     Problem/Plan - 4:  ·  Problem: HLD (hyperlipidemia).   ·  Plan: c/w Atorvastatin 20mg qd.     Problem/Plan - 5:  ·  Problem: Need for prophylactic measure.   ·  Plan: DVT PPx: lovenox 40mg qd  Diet: DASH/TLC  Ethics: full code  Dispo: DC home today.  
Mr. Peterson is 58-year-old male, past medical history of emphysema/COPD and hyperlipidemia, presents to ED for worsening shortness of breath associated with chest pain x 2 days, admitted for acute hypoxic hypercapnic respiratory failure likely due to COPD exacerbation.       Problem/Plan - 1:  ·  Problem: Acute hypoxic on chronic hypercapnic respiratory failure.   ·  Plan: Pt p/w worsening dyspnea, with reported O2 sat 70s on RA. Has a known hx of COPD, is not on home oxygen. VBG shows hypercarbia (60) with HCO3- of  32 on VBG, indicating chronic hypercapnia to an extent. Repeat VBG (looks like an ABG based on O2%) shows PCO2 down to 49 on BiPAP. C/f Acute Hypoxic Hypercapnic Respiratory Failure from COPD exacerbation: has dyspnea, increased sputum production and cough.  - c/f COPD exacerbation: unclear source at this time. No consolidation on CXR, RVP negative  - management: Antibiotics, Bronchodilators, Corticosteroids  - Continue with coverage for CAP ( CTX + Azithro). Follow up strep pneumo and urine legionella. Less likely to have PNA, but will continue until DC, likely 3/26  - Continue bronchodilator: DuoNebs q4h for standing for now; Symbicort 160/4.5 2 puffs BID  - Continue steroids: Now on PO 40mg prednisone. Given response so far will pursue longer than 5 day total course with a taper. Plan for decrease by 10mg every 3 days. Can be extended as outpatient as needed based on continued response  - Ambulatory saturation as low as 90% on room air on 3/25  - Plan for pulm follow up on DC.     Problem/Plan - 2:  ·  Problem: Abdominal distension.   ·  Plan: Endorses 1-2 day history of abdominal distension  Abdominal XR without any signs cocerning for free air or stool burden  Patient having regular bowel movements  Reports feeling bloated    Plan:  Simethicone PRN.     Problem/Plan - 3:  ·  Problem: History of COPD.   ·  Plan: Pt follows with Dr. Kevon Acuña, Pulm/Crit. He is on Spiriva, Fluticasone, Montelukast 10mg daily. Not on home O2 as of now. Likely due to chronic cigarette smoking, has quit now  - Continue symbicort and duonebs  - Management of COPD exacerbation as above  - Consider flu vaccine here  - Consider Pneumococcal vaccine if has not had.     Problem/Plan - 4:  ·  Problem: HLD (hyperlipidemia).   ·  Plan: c/w Atorvastatin 20mg qd.     Problem/Plan - 5:  ·  Problem: Need for prophylactic measure.   ·  Plan: DVT PPx: lovenox 40mg qd  Diet: DASH/TLC  Ethics: full code  Dispo: pending clinical improvement.  
· Assessment	  Mr. Peterson is 58-year-old male, past medical history of emphysema/COPD and hyperlipidemia, presents to ED for worsening shortness of breath associated with chest pain x 2 days, admitted for acute hypoxic hypercapnic respiratory failure likely due to COPD exacerbation.       Problem/Plan - 1:  ·  Problem: Acute hypoxic on chronic hypercapnic respiratory failure.   ·  Plan: Pt p/w worsening dyspnea, with reported O2 sat 70s on RA. Has a known hx of COPD, is not on home oxygen. VBG shows hypercarbia (60) with HCO3- of  32 on VBG, indicating chronic hypercapnia to an extent. Repeat VBG (looks like an ABG based on O2%) shows PCO2 down to 49 on BiPAP. C/f Acute Hypoxic Hypercapnic Respiratory Failure from COPD exacerbation: has dyspnea, increased sputum production and cough.  - c/f COPD exacerbation: unclear source at this time. No consolidation on CXR, RVP negative  - management: Antibiotics, Bronchodilators, Corticosteroids  - Continue with coverage for CAP ( CTX + Azithro), 5d treatment. Follow up strep pneumo and urine legionella  - Continue bronchodilator: DuoNebs q4h for standing for now; Symbicort 160/4.5 2 puffs BID  - Continue steroids: Hydrocortisone 60mg x2 doses on 3/23. PO prednisone 40mg daily for 4 additional days to finish  - Weaned off BiPAP to nasal cannula. Continue nasal cannula and wean as tolerated  - Patient repeatedly refusing albuterol  - Will pursue ambulatory saturations today     Problem/Plan - 2:  ·  Problem: Abdominal distension.   ·  Plan: Endorses 1-2 day history of abdominal distension  Abdominal XR without any signs concerning for free air or stool burden  Patient having regular bowel movements  Reports feeling bloated    Plan:  Simethicone PRN.     Problem/Plan - 3:  ·  Problem: History of COPD.   ·  Plan: Pt follows with Dr. Kevon Acuña, Pulm/Crit. He is on Spiriva, Fluticasone, Montelukast 10mg daily. Not on home O2 as of now. Likely due to chronic cigarette smoking, has quit now  - Continue symbicort and duonebs  - Management of COPD exacerbation as above  - Consider flu vaccine here  - Consider Pneumococcal vaccine if has not had.     Problem/Plan - 4:  ·  Problem: HLD (hyperlipidemia).   ·  Plan: c/w Atorvastatin 20mg qd.     Problem/Plan - 5:  ·  Problem: Need for prophylactic measure.   ·  Plan: DVT PPx: lovenox 40mg qd  Diet: DASH/TLC  Ethics: full code  Dispo: pending clinical improvement.

## 2024-03-27 NOTE — PROGRESS NOTE ADULT - PROBLEM SELECTOR PLAN 2
Endorses 1-2 day history of abdominal distension  Abdominal XR without any signs concerning for free air or stool burden  Patient having regular bowel movements  Reports feeling bloated    Plan:  Simethicone PRN

## 2024-03-27 NOTE — PROGRESS NOTE ADULT - ATTENDING COMMENTS
57 y/o M with pmhx of COPD/Emphysema, previous smoker who presented to the ED secondary to acute on chronic shortness of breath, with worsening over time, required BiPAP for hypoxemic respiratory failure    #COPD exacerbation   - CXR with b/l intersitial opacities  - RVP negative  - transitioned to PO steroids   - c/w azithromycin for COPD exacerbation      #Abdominal Distension   - endorse  appetite due to bloating  - Abd xray without any signs of obstruction  - supportive measures anti gas medications
57 y/o M with pmhx of COPD/Emphysema, previous smoker who presented to the ED secondary to acute on chronic shortness of breath, with worsening over time, required BiPAP for hypoxemic respiratory failure    #COPD exacerbation   - CXR with b/l intersitial opacities  - RVP negative  - transitioned to PO steroids   - s/pazithromycin for COPD exacerbation  - Resp status improved today, DC planning today, has HOME telehealth appt setup.      DC time 38 minutes
57 y/o M with pmhx of COPD/Emphysema, previous smoker who presented to the ED secondary to acute on chronic shortness of breath, with worsening over time. IN ED patient was initially sating at 73%/ Patient was initially on BiPAP in the ED due to increased respiratory distress. Currently off BiPAP and now on 4L NC sating well. Patient states that he feels "50% better" compared to yesterday.     On exam, no wheezing appreciated, no increased WOB. Able to speak in full sentences without difficulty. Notable abdominal distension, non tender to palpation.     #COPD exacerbation   - CXR with b/l intersitial opacities  - RVP negative  - continue with IV steroids with plan to transition to oral steroids in AM  - c/w CTX and azithro for COPD exacerbation/CAP coverage   - not on home O2 so will wean as tolerated   - plan for ambulatory sats in AM to assess need for O2     #Abdominal Distension   - Had BM this AM  - endorse  appetite due to bloating  - will obtain abdominal Xray to evaluate
59 y/o M with pmhx of COPD/Emphysema, previous smoker who presented to the ED secondary to acute on chronic shortness of breath, with worsening over time, required BiPAP for hypoxemic respiratory failure    #COPD exacerbation   - CXR with b/l intersitial opacities  - RVP negative  - transitioned to PO steroids   - c/w azithromycin for COPD exacerbation  - Resp status slightly improved today, DC planning, potential Dc tomorrow with close pulm follow-up      #Abdominal Distension   - endorse  appetite due to bloating  - Abd xray without any signs of obstruction  - supportive measures anti gas medications
57 y/o M with pmhx of COPD/Emphysema, previous smoker who presented to the ED secondary to acute on chronic shortness of breath, with worsening over time. IN ED patient was initially sating at 73%/ Patient was initially on BiPAP in the ED due to increased respiratory distress. Currently patient appear well, denies any SOB or Chest pain. Feels improved compared to yesterday. Did not use BiPAP overnight.     #COPD exacerbation   - CXR with b/l intersitial opacities  - RVP negative  - transitioned to PO steroids this AM  - will plan for ambulatory sats this AM to assess need for Home O2   - c/w CTX and azithromycin for COPD exacerbation/CAP coverage       #Abdominal Distension   - endorse  appetite due to bloating  - Abd xray without any signs of obstruction  - supportive measures anti gas medications

## 2024-03-27 NOTE — MEDICAL STUDENT PROGRESS NOTE(EDUCATION) - SUBJECTIVE AND OBJECTIVE BOX
Progress Note  03-23-24 (2d)  Patient is a 58y old  Male who presents with a chief complaint of Hypercapnic respiratory failure (25 Mar 2024 07:56)    Subjective / Interval Events :  - No acute events overnight.  - Pt states he is still short of breath and head difficulty ambulating to the bathroom overnight due to SOB. He denies chest pain, nausea, vomiting, AMS/LOC.  - Pt seen and examined at bedside.     Additional ROS (if any): see above, otherwise negative     MEDICATIONS  (STANDING):  albuterol/ipratropium for Nebulization 3 milliLiter(s) Nebulizer every 4 hours  atorvastatin 10 milliGRAM(s) Oral at bedtime  azithromycin  IVPB 500 milliGRAM(s) IV Intermittent every 24 hours  budesonide 160 MICROgram(s)/formoterol 4.5 MICROgram(s) Inhaler 2 Puff(s) Inhalation two times a day  cefTRIAXone   IVPB 1000 milliGRAM(s) IV Intermittent every 24 hours  enoxaparin Injectable 40 milliGRAM(s) SubCutaneous every 24 hours  famotidine    Tablet 40 milliGRAM(s) Oral daily  montelukast 10 milliGRAM(s) Oral daily  predniSONE   Tablet 40 milliGRAM(s) Oral daily    MEDICATIONS  (PRN):  acetaminophen     Tablet .. 650 milliGRAM(s) Oral every 6 hours PRN Temp greater or equal to 38C (100.4F), Mild Pain (1 - 3)  aluminum hydroxide/magnesium hydroxide/simethicone Suspension 30 milliLiter(s) Oral every 4 hours PRN Dyspepsia  melatonin 3 milliGRAM(s) Oral at bedtime PRN Insomnia  ondansetron Injectable 4 milliGRAM(s) IV Push every 8 hours PRN Nausea and/or Vomiting  polyethylene glycol 3350 17 Gram(s) Oral daily PRN Constipation  simethicone 80 milliGRAM(s) Chew daily PRN Indigestion      PHYSICAL EXAM:  Vital Signs Last 24 Hrs  T(C): 36.6 (25 Mar 2024 05:30), Max: 36.6 (25 Mar 2024 05:30)  T(F): 97.9 (25 Mar 2024 05:30), Max: 97.9 (25 Mar 2024 05:30)  HR: 92 (25 Mar 2024 05:30) (66 - 101)  BP: 107/62 (25 Mar 2024 05:30) (100/52 - 110/62)  BP(mean): --  RR: 18 (25 Mar 2024 05:30) (18 - 20)  SpO2: 98% (25 Mar 2024 05:30) (98% - 99%)    Parameters below as of 25 Mar 2024 05:30  Patient On (Oxygen Delivery Method): nasal cannula  O2 Flow (L/min): 2      General: NAD, non-toxic appearing   HEENT: EOMi, no scleral icterus  CV: RRR, normal S1 and S2  Lungs: Shallow respirations, End expiratory wheezes b/l  Abd: soft, nontender, distended, + BS  Ext: no edema, warm, well perfused, pulses palpable x4  Pysch: AAOx3, appropriate affect   Neuro: grossly non-focal, moving all extremities spontaneously   Skin: no rashes or lesions       LABS:                          12.7   8.83  )-----------( 303      ( 25 Mar 2024 06:19 )             39.3       WBC Trend: 8.83<--, 9.61<--, 3.38<--  Hb Trend: 12.7<--, 12.5<--, 13.0<--, 14.3<--    03-25    139  |  98  |  15  ----------------------------<  134<H>  4.0   |  31  |  0.65    Ca    8.7      25 Mar 2024 06:19  Phos  2.8     03-25  Mg     2.20     03-25    TPro  6.9  /  Alb  3.3  /  TBili  <0.2  /  DBili  x   /  AST  22  /  ALT  24  /  AlkPhos  61  03-25          Urinalysis Basic - ( 25 Mar 2024 06:19 )    Color: x / Appearance: x / SG: x / pH: x  Gluc: 134 mg/dL / Ketone: x  / Bili: x / Urobili: x   Blood: x / Protein: x / Nitrite: x   Leuk Esterase: x / RBC: x / WBC x   Sq Epi: x / Non Sq Epi: x / Bacteria: x        Culture - Blood (collected 23 Mar 2024 06:58)  Source: .Blood Blood-Peripheral  Preliminary Report (24 Mar 2024 11:01):    No growth at 24 hours    Culture - Blood (collected 23 Mar 2024 06:58)  Source: .Blood Blood-Venous  Preliminary Report (24 Mar 2024 11:01):    No growth at 24 hours          RADIOLOGY & ADDITIONAL TESTS: Reviewed  - No new images
Progress Note  03-23-24 (3d)  Patient is a 58y old  Male who presents with a chief complaint of Hypercapnic respiratory failure (25 Mar 2024 07:56)    Subjective / Interval Events :  - No acute events overnight.  - Pt states he feels well  - Patient continues to have subjective SOB when walking  - Patient has improved usage of duoneb  - Pt seen and examined at bedside.     Additional ROS (if any): see above, otherwise negative     MEDICATIONS  (STANDING):  albuterol/ipratropium for Nebulization 3 milliLiter(s) Nebulizer every 4 hours  atorvastatin 10 milliGRAM(s) Oral at bedtime  azithromycin  IVPB 500 milliGRAM(s) IV Intermittent every 24 hours  budesonide 160 MICROgram(s)/formoterol 4.5 MICROgram(s) Inhaler 2 Puff(s) Inhalation two times a day  cefTRIAXone   IVPB 1000 milliGRAM(s) IV Intermittent every 24 hours  enoxaparin Injectable 40 milliGRAM(s) SubCutaneous every 24 hours  famotidine    Tablet 40 milliGRAM(s) Oral daily  montelukast 10 milliGRAM(s) Oral daily  predniSONE   Tablet 40 milliGRAM(s) Oral daily  senna 2 Tablet(s) Oral at bedtime    MEDICATIONS  (PRN):  acetaminophen     Tablet .. 650 milliGRAM(s) Oral every 6 hours PRN Temp greater or equal to 38C (100.4F), Mild Pain (1 - 3)  aluminum hydroxide/magnesium hydroxide/simethicone Suspension 30 milliLiter(s) Oral every 4 hours PRN Dyspepsia  melatonin 3 milliGRAM(s) Oral at bedtime PRN Insomnia  ondansetron Injectable 4 milliGRAM(s) IV Push every 8 hours PRN Nausea and/or Vomiting  polyethylene glycol 3350 17 Gram(s) Oral daily PRN Constipation  simethicone 80 milliGRAM(s) Chew daily PRN Indigestion      CAPILLARY BLOOD GLUCOSE        I&O's Summary    25 Mar 2024 07:01  -  26 Mar 2024 07:00  --------------------------------------------------------  IN: 0 mL / OUT: 750 mL / NET: -750 mL        PHYSICAL EXAM:  Vital Signs Last 24 Hrs  T(C): 36.7 (26 Mar 2024 06:03), Max: 36.7 (25 Mar 2024 14:00)  T(F): 98 (26 Mar 2024 06:03), Max: 98.1 (25 Mar 2024 14:00)  HR: 75 (26 Mar 2024 06:03) (75 - 107)  BP: 113/73 (26 Mar 2024 06:03) (106/66 - 113/73)  RR: 18 (26 Mar 2024 06:03) (18 - 20)  SpO2: 96% (26 Mar 2024 06:03) (92% - 97%)    Parameters below as of 25 Mar 2024 20:59  Patient On (Oxygen Delivery Method): room air        General: NAD, non-toxic appearing   HEENT: EOMi, no scleral icterus  CV: RRR, normal S1 and S2, pulses palpable in all 4 extremeties  Lungs: normal respiratory effort, CTAB, no wheezes, rales, rhonchi appreciated  Abd: soft, nontender, mild distention, + BS  Ext: no edema, warm, well perfused  Pysch: AAOx3, appropriate affect   Neuro: grossly non-focal, moving all extremities spontaneously   Skin: no rashes or lesions       LABS:                          14.3   9.34  )-----------( 347      ( 26 Mar 2024 06:52 )             43.7       WBC Trend: 9.34<--, 8.83<--, 9.61<--  Hb Trend: 14.3<--, 12.7<--, 12.5<--, 13.0<--, 14.3<--    03-25    139  |  98  |  15  ----------------------------<  134<H>  4.0   |  31  |  0.65    Ca    8.7      25 Mar 2024 06:19  Phos  2.8     03-25  Mg     2.20     03-25    TPro  6.9  /  Alb  3.3  /  TBili  <0.2  /  DBili  x   /  AST  22  /  ALT  24  /  AlkPhos  61  03-25          Urinalysis Basic - ( 25 Mar 2024 06:19 )    Color: x / Appearance: x / SG: x / pH: x  Gluc: 134 mg/dL / Ketone: x  / Bili: x / Urobili: x   Blood: x / Protein: x / Nitrite: x   Leuk Esterase: x / RBC: x / WBC x   Sq Epi: x / Non Sq Epi: x / Bacteria: x            RADIOLOGY & ADDITIONAL TESTS: Reviewed  - No new images
Progress Note  03-23-24 (4d)  Patient is a 58y old  Male who presents with a chief complaint of Hypercapnic respiratory failure (27 Mar 2024 07:45)    Subjective / Interval Events :  - No acute events overnight.  - Patient states he feels well. He endorses only mild SOB when walking but states he feels it is improved.  - Patient ambulated to bathroom without difficulty  - Pt seen and examined at bedside.     Additional ROS (if any): see above, otherwise negative     MEDICATIONS  (STANDING):  albuterol/ipratropium for Nebulization 3 milliLiter(s) Nebulizer every 4 hours  atorvastatin 10 milliGRAM(s) Oral at bedtime  azithromycin  IVPB 500 milliGRAM(s) IV Intermittent every 24 hours  budesonide 160 MICROgram(s)/formoterol 4.5 MICROgram(s) Inhaler 2 Puff(s) Inhalation two times a day  cefTRIAXone   IVPB 1000 milliGRAM(s) IV Intermittent every 24 hours  enoxaparin Injectable 40 milliGRAM(s) SubCutaneous every 24 hours  famotidine    Tablet 40 milliGRAM(s) Oral daily  montelukast 10 milliGRAM(s) Oral daily  senna 2 Tablet(s) Oral at bedtime    MEDICATIONS  (PRN):  acetaminophen     Tablet .. 650 milliGRAM(s) Oral every 6 hours PRN Temp greater or equal to 38C (100.4F), Mild Pain (1 - 3)  aluminum hydroxide/magnesium hydroxide/simethicone Suspension 30 milliLiter(s) Oral every 4 hours PRN Dyspepsia  melatonin 3 milliGRAM(s) Oral at bedtime PRN Insomnia  ondansetron Injectable 4 milliGRAM(s) IV Push every 8 hours PRN Nausea and/or Vomiting  polyethylene glycol 3350 17 Gram(s) Oral daily PRN Constipation  simethicone 80 milliGRAM(s) Chew daily PRN Indigestion    PHYSICAL EXAM:  Vital Signs Last 24 Hrs  T(C): 36.7 (27 Mar 2024 05:36), Max: 37 (26 Mar 2024 22:16)  T(F): 98.1 (27 Mar 2024 05:36), Max: 98.6 (26 Mar 2024 22:16)  HR: 77 (27 Mar 2024 05:36) (74 - 98)  BP: 116/74 (27 Mar 2024 05:36) (101/86 - 137/85)  RR: 18 (27 Mar 2024 05:36) (18 - 18)  SpO2: 97% (27 Mar 2024 05:36) (97% - 100%)    Parameters below as of 27 Mar 2024 05:36  Patient On (Oxygen Delivery Method): room air        General: NAD, non-toxic appearing   HEENT: EOMi, no scleral icterus  CV: RRR, normal S1 and S2  Lungs: normal respiratory effort, CTAB, no wheezes, rales, rhonchi  Abd: soft, nontender, nondistended  Ext: no edema, warm, well perfused, 2+ pulses in all 4 extremeties  Pysch: AAOx3, appropriate affect   Neuro: grossly non-focal, moving all extremities spontaneously   Skin: no rashes or lesions       LABS:  - No new labs          RADIOLOGY & ADDITIONAL TESTS: Reviewed  - No new images

## 2024-03-27 NOTE — PROGRESS NOTE ADULT - PROBLEM SELECTOR PROBLEM 1
Acute hypoxic on chronic hypercapnic respiratory failure

## 2024-03-28 ENCOUNTER — APPOINTMENT (OUTPATIENT)
Dept: PULMONOLOGY | Facility: CLINIC | Age: 59
End: 2024-03-28
Payer: MEDICAID

## 2024-03-28 LAB
CULTURE RESULTS: SIGNIFICANT CHANGE UP
CULTURE RESULTS: SIGNIFICANT CHANGE UP
SPECIMEN SOURCE: SIGNIFICANT CHANGE UP
SPECIMEN SOURCE: SIGNIFICANT CHANGE UP

## 2024-03-28 PROCEDURE — 99441: CPT | Mod: 93

## 2024-03-28 NOTE — ASSESSMENT
[FreeTextEntry1] : 57 yo M w/ PMH of COPD, hyperlipidemia who presents with acute hypoxic, hypercapnic respiratory failure who was admitted for acute COPD exacerbation.   - complete prednisone taper as above - continue ICS/LABA/LAMA - albuterol prn  - in person evaluation next week  - check PFTs

## 2024-03-28 NOTE — PHYSICAL EXAM
[de-identified] :  On exam via video he is awake, alert, and in no acute distress. He is speaking in full sentences. There is no overt wheezing or frequent cough. He does not appear dyspneic or in any respiratory distress. He is answering questions appropriately.

## 2024-03-28 NOTE — HISTORY OF PRESENT ILLNESS
[Home] : at home, [unfilled] , at the time of the visit. [Medical Office: ([facility])___] : at the medical office located in  [Family Member] : family member [Verbal consent obtained from patient] : the patient, [unfilled] [[facility]] : [facility]-hospitalization from [facility] [Yes] : Yes [Admitted on: ___] : The patient was admitted on [unfilled] [Discharged on ___] : discharged on [unfilled] [Discharge Summary] : discharge summary [Pertinent Labs] : pertinent labs [Patient Contacted By: ____] : and contacted by [unfilled] [FreeTextEntry2] : 57 yo M w/ PMH of COPD, hyperlipidemia who presents with acute hypoxic, hypercapnic respiratory failure who was admitted for acute copd exacerbation.  Initially severely dyspneic requiried BiPAP and was weaned off with improvement of blood gas. Initialyl on 6L NC then titrated to room air. Empirically tx for CAP though CXR wihtout consolidation, RVP negative.   First hospitalization for respiratory distress/COPD.  Symbicort 2 puffs bid Spiriva respimat 2 puffs once a day  has not required albuterol

## 2024-04-03 ENCOUNTER — APPOINTMENT (OUTPATIENT)
Dept: PULMONOLOGY | Facility: CLINIC | Age: 59
End: 2024-04-03
Payer: MEDICAID

## 2024-04-03 VITALS
OXYGEN SATURATION: 91 % | WEIGHT: 132 LBS | HEART RATE: 113 BPM | DIASTOLIC BLOOD PRESSURE: 87 MMHG | SYSTOLIC BLOOD PRESSURE: 145 MMHG

## 2024-04-03 PROCEDURE — 99214 OFFICE O/P EST MOD 30 MIN: CPT

## 2024-04-03 RX ORDER — TIOTROPIUM BROMIDE INHALATION SPRAY 3.12 UG/1
2.5 SPRAY, METERED RESPIRATORY (INHALATION) DAILY
Qty: 1 | Refills: 5 | Status: ACTIVE | COMMUNITY
Start: 2024-04-03 | End: 1900-01-01

## 2024-04-03 RX ORDER — IPRATROPIUM BROMIDE AND ALBUTEROL SULFATE 2.5; .5 MG/3ML; MG/3ML
0.5-2.5 (3) SOLUTION RESPIRATORY (INHALATION)
Qty: 1 | Refills: 5 | Status: ACTIVE | COMMUNITY
Start: 2024-04-03 | End: 1900-01-01

## 2024-04-03 RX ORDER — BUDESONIDE AND FORMOTEROL FUMARATE DIHYDRATE 160; 4.5 UG/1; UG/1
160-4.5 AEROSOL RESPIRATORY (INHALATION) TWICE DAILY
Qty: 1 | Refills: 5 | Status: ACTIVE | COMMUNITY
Start: 2024-04-03 | End: 1900-01-01

## 2024-04-03 NOTE — ASSESSMENT
[FreeTextEntry1] : 57 yo M w/ PMH of COPD, hyperlipidemia who presents with acute hypoxic, hypercapnic respiratory failure who was admitted for acute COPD exacerbation 3/23-3/27/2024.  Currently on ICS/LABA/LAMA therapy and adherent. He is using nebulizers and albuterol prn daily. Desptie this and recent prednisone taper he remains symptomatic.   - check PFTs - CT chest   - labs as below - TTE to evaluate for HF vs pHTN  - continue ICS/LABA/LAMA w/ Symbicort and Spiriva, albuterol prn  - inhaler technique reviewed/educated x 11 mins    I spent 38 minutes during this encounter. Total time excludes separate billing services.
General Pediatrics

## 2024-04-03 NOTE — HISTORY OF PRESENT ILLNESS
[TextBox_4] : [place] : Number [id number] Lacy (Gila)  57 yo M w/ PMH of COPD, hyperlipidemia who presents with acute hypoxic, hypercapnic respiratory failure who was admitted for acute COPD exacerbation 3/23-3/27/2024.  Initially severely dyspneic required BiPAP and was weaned off with improvement of blood gas. Initialyl on 6L NC then titrated to room air. Empirically tx for CAP though CXR without consolidation, RVP negative. First hospitalization for respiratory distress/COPD.  Dyspnea started 25 years, progressively worsening. No cough, no fevers currently. He feels better than when he was admitted, though still has dyspnea at rest and with exertion. No chest pain, no known hx of cardiac events  Former smoker - quit 15 years ago, smoked 10 cig/day for 25 years Adherent with Symbicort 2 puffs bid, Spiriva respimat 2 puffs once a day, albuterol inh prn. Using duoneb nebulizer tid at home.  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

## 2024-04-03 NOTE — PHYSICAL EXAM
[No Acute Distress] : no acute distress [Normal Rate/Rhythm] : normal rate/rhythm [Normal Appearance] : normal appearance [No Resp Distress] : no resp distress [Normal Gait] : normal gait [No Edema] : no edema [Oriented x3] : oriented x3 [Normal Affect] : normal affect [TextBox_68] : crackles inspiratory at bases

## 2024-04-13 ENCOUNTER — APPOINTMENT (OUTPATIENT)
Dept: CT IMAGING | Facility: CLINIC | Age: 59
End: 2024-04-13
Payer: MEDICAID

## 2024-04-13 ENCOUNTER — OUTPATIENT (OUTPATIENT)
Dept: OUTPATIENT SERVICES | Facility: HOSPITAL | Age: 59
LOS: 1 days | End: 2024-04-13
Payer: MEDICAID

## 2024-04-13 DIAGNOSIS — J44.9 CHRONIC OBSTRUCTIVE PULMONARY DISEASE, UNSPECIFIED: ICD-10-CM

## 2024-04-13 PROCEDURE — 71250 CT THORAX DX C-: CPT

## 2024-04-13 PROCEDURE — 71250 CT THORAX DX C-: CPT | Mod: 26

## 2024-04-15 LAB
ANION GAP SERPL CALC-SCNC: 14 MMOL/L
BASOPHILS # BLD AUTO: 0.02 K/UL
BASOPHILS NFR BLD AUTO: 0.1 %
BUN SERPL-MCNC: 14 MG/DL
CALCIUM SERPL-MCNC: 9.8 MG/DL
CHLORIDE SERPL-SCNC: 101 MMOL/L
CO2 SERPL-SCNC: 27 MMOL/L
CREAT SERPL-MCNC: 0.89 MG/DL
EGFR: 100 ML/MIN/1.73M2
EOSINOPHIL # BLD AUTO: 0 K/UL
EOSINOPHIL NFR BLD AUTO: 0 %
GLUCOSE SERPL-MCNC: 133 MG/DL
HCT VFR BLD CALC: 49 %
HGB BLD-MCNC: 15.2 G/DL
IMM GRANULOCYTES NFR BLD AUTO: 1.1 %
LYMPHOCYTES # BLD AUTO: 2.15 K/UL
LYMPHOCYTES NFR BLD AUTO: 15.8 %
MAN DIFF?: NORMAL
MCHC RBC-ENTMCNC: 28.6 PG
MCHC RBC-ENTMCNC: 31 GM/DL
MCV RBC AUTO: 92.3 FL
MONOCYTES # BLD AUTO: 0.65 K/UL
MONOCYTES NFR BLD AUTO: 4.8 %
NEUTROPHILS # BLD AUTO: 10.63 K/UL
NEUTROPHILS NFR BLD AUTO: 78.2 %
NT-PROBNP SERPL-MCNC: 40 PG/ML
PLATELET # BLD AUTO: 299 K/UL
POTASSIUM SERPL-SCNC: 5.4 MMOL/L
RBC # BLD: 5.31 M/UL
RBC # FLD: 14 %
SODIUM SERPL-SCNC: 141 MMOL/L
WBC # FLD AUTO: 13.6 K/UL

## 2024-04-17 ENCOUNTER — NON-APPOINTMENT (OUTPATIENT)
Age: 59
End: 2024-04-17

## 2024-04-17 ENCOUNTER — APPOINTMENT (OUTPATIENT)
Dept: CARDIOLOGY | Facility: HOSPITAL | Age: 59
End: 2024-04-17

## 2024-04-17 VITALS
SYSTOLIC BLOOD PRESSURE: 137 MMHG | BODY MASS INDEX: 21.69 KG/M2 | HEART RATE: 110 BPM | WEIGHT: 135 LBS | OXYGEN SATURATION: 92 % | HEIGHT: 66 IN | DIASTOLIC BLOOD PRESSURE: 88 MMHG

## 2024-04-17 NOTE — DISCUSSION/SUMMARY
[FreeTextEntry1] : Patient is a 59 y/o M with PMH COPD (follows with pulm, recent admission for COPD), HLD, presenting for first time to cardiology for CARNES.  Saw pulm on 4/3/24 - is on  ICS/LABA/LAMA with albuterol PRN. Prior smoker, but quit 15 years ago. Was ordered for PFTs and TTE at pulm visit, but not yet done. He denies any wheezing, just severe SOB. No CP at rest or with exercise. No leg swelling. Palpitations only with dyspnea.   Current symptoms: severe CARNES, can't even walk 1 minute before he has to stop to rest. Even walking inside his house is difficult now. Symptoms have been ongoing for about 2 years, but worsened severely about 1 month ago.   Pro-BNP on 4/3 was only 40.   Current meds: ICS/LABA/LAMA, Lipitor 10, Calcium, Pepcid, vitamin B complex, Singulair Social: prior smoker, quit 15 years ago.  Impression/Plan: - CARNES seems more pulmonary in etiology, but will get some workup - Labs today: Lipid panel, A1c - TTE - reportedly was scheduled for today but patient was late so will need to reschedule - Ordered for nuclear exercise stress test - if unable to exercise due to CARNES, can switch to pharm  Case discussed with Dr. Rubio

## 2024-04-17 NOTE — REASON FOR VISIT
[FreeTextEntry1] : [place] : Gila [phone]  Patient is a 59 y/o M with PMH COPD (follows with pulm, recent admission for COPD), HLD, presenting for first time to cardiology for CARNES.  Saw pulm on 4/3/24 - is on  ICS/LABA/LAMA with albuterol PRN. Prior smoker, but quit 15 years ago. Was ordered for PFTs and TTE at pulm visit, but not yet done. He denies any wheezing, just severe SOB. No CP at rest or with exercise. No leg swelling. Palpitations only with dyspnea.   Current symptoms: severe CARNES, can't even walk 1 minute before he has to stop to rest. Even walking inside his house is difficult now. Symptoms have been ongoing for about 2 years, but worsened severely about 1 month ago.   Pro-BNP on 4/3 was only 40.   Current meds: ICS/LABA/LAMA, Lipitor 10, Calcium, Pepcid, vitamin B complex, Singulair Social: prior smoker, quit 15 years ago

## 2024-05-15 ENCOUNTER — APPOINTMENT (OUTPATIENT)
Dept: CARDIOLOGY | Facility: HOSPITAL | Age: 59
End: 2024-05-15

## 2024-05-15 VITALS
SYSTOLIC BLOOD PRESSURE: 133 MMHG | TEMPERATURE: 98.1 F | RESPIRATION RATE: 16 BRPM | OXYGEN SATURATION: 94 % | BODY MASS INDEX: 21.79 KG/M2 | HEART RATE: 77 BPM | WEIGHT: 135 LBS | DIASTOLIC BLOOD PRESSURE: 79 MMHG

## 2024-05-15 NOTE — HISTORY OF PRESENT ILLNESS
[FreeTextEntry1] : [place] : Gila [phone]  Patient is a 59 y/o M with PMH COPD (follows with pulm, recent admission for COPD), HLD, presenting for follow-up.  Saw pulm on 4/3/24 - is on ICS/LABA/LAMA with albuterol PRN. Prior smoker, but quit 15 years ago. Was ordered for PFTs and TTE at pulm visit, but not yet done. He denies any wheezing, just severe SOB. No CP at rest or with exercise. No leg swelling. Palpitations only with dyspnea.  I saw him 2 weeks ago and ordered TTE and stress test but he has not yet done these. He was not aware he needed to have these tests before a follow-up.  Current symptoms: severe CARNES, can't even walk 1 minute before he has to stop to rest. Symptoms have improved a little bit since last visit. He is now able to walk ~5 minutes before resting.  Pro-BNP on 4/3 was only 40.  Current meds: ICS/LABA/LAMA, Lipitor 10, Calcium, Pepcid, vitamin B complex, Singulair Social: prior smoker, quit 15 years ago

## 2024-05-15 NOTE — DISCUSSION/SUMMARY
[FreeTextEntry1] : 59 y/o M with PMH COPD (follows with pulm, recent admission for COPD), HLD, presenting for follow-up.  Saw pulm on 4/3/24 - is on ICS/LABA/LAMA with albuterol PRN. Prior smoker, but quit 15 years ago. Was ordered for PFTs and TTE at pulm visit, but not yet done. He denies any wheezing, just severe SOB. No CP at rest or with exercise. No leg swelling. Palpitations only with dyspnea.  I saw him 2 weeks ago and ordered TTE and stress test but he has not yet done these. He was not aware he needed to have these tests before a follow-up.  Current symptoms: severe CARNES, can't even walk 1 minute before he has to stop to rest. Symptoms have improved a little bit since last visit. He is now able to walk ~5 minutes before resting.  Pro-BNP on 4/3 was only 40.  Current meds: ICS/LABA/LAMA, Lipitor 10, Calcium, Pepcid, vitamin B complex, Singulair Social: prior smoker, quit 15 years ago  Impression/Plan: - CARNES seems more pulmonary in etiology, but will get basic workup - TTE -  helped patient schedule - Nuclear exercise stress test - if unable to exercise due to CARNES, can switch to pharm -  helped patient schedule - Return to clinic after the above 2 tests are complete

## 2024-05-24 ENCOUNTER — NON-APPOINTMENT (OUTPATIENT)
Age: 59
End: 2024-05-24

## 2024-06-03 ENCOUNTER — APPOINTMENT (OUTPATIENT)
Dept: PULMONOLOGY | Facility: CLINIC | Age: 59
End: 2024-06-03

## 2024-06-21 ENCOUNTER — APPOINTMENT (OUTPATIENT)
Dept: CV DIAGNOSTICS | Facility: HOSPITAL | Age: 59
End: 2024-06-21

## 2024-06-21 ENCOUNTER — RESULT REVIEW (OUTPATIENT)
Age: 59
End: 2024-06-21

## 2024-06-21 ENCOUNTER — APPOINTMENT (OUTPATIENT)
Dept: CV DIAGNOSITCS | Facility: HOSPITAL | Age: 59
End: 2024-06-21

## 2024-06-26 ENCOUNTER — TRANSCRIPTION ENCOUNTER (OUTPATIENT)
Age: 59
End: 2024-06-26

## 2024-06-26 ENCOUNTER — APPOINTMENT (OUTPATIENT)
Dept: PULMONOLOGY | Facility: CLINIC | Age: 59
End: 2024-06-26
Payer: COMMERCIAL

## 2024-06-26 VITALS
SYSTOLIC BLOOD PRESSURE: 151 MMHG | DIASTOLIC BLOOD PRESSURE: 82 MMHG | BODY MASS INDEX: 21.38 KG/M2 | TEMPERATURE: 98 F | RESPIRATION RATE: 15 BRPM | OXYGEN SATURATION: 90 % | HEIGHT: 66 IN | WEIGHT: 133 LBS | HEART RATE: 84 BPM

## 2024-06-26 DIAGNOSIS — R06.02 SHORTNESS OF BREATH: ICD-10-CM

## 2024-06-26 DIAGNOSIS — J44.9 CHRONIC OBSTRUCTIVE PULMONARY DISEASE, UNSPECIFIED: ICD-10-CM

## 2024-06-26 PROCEDURE — 99204 OFFICE O/P NEW MOD 45 MIN: CPT

## 2024-06-27 PROBLEM — J44.9 CHRONIC OBSTRUCTIVE PULMONARY DISEASE, UNSPECIFIED COPD TYPE: Status: ACTIVE | Noted: 2024-03-28

## 2024-06-27 PROBLEM — R06.02 SHORTNESS OF BREATH: Status: ACTIVE | Noted: 2024-03-28

## 2024-07-31 ENCOUNTER — APPOINTMENT (OUTPATIENT)
Dept: CARDIOLOGY | Facility: HOSPITAL | Age: 59
End: 2024-07-31

## 2024-07-31 VITALS
HEART RATE: 85 BPM | OXYGEN SATURATION: 93 % | DIASTOLIC BLOOD PRESSURE: 77 MMHG | BODY MASS INDEX: 21.79 KG/M2 | RESPIRATION RATE: 16 BRPM | WEIGHT: 135 LBS | TEMPERATURE: 98.4 F | SYSTOLIC BLOOD PRESSURE: 128 MMHG

## 2024-07-31 DIAGNOSIS — I10 ESSENTIAL (PRIMARY) HYPERTENSION: ICD-10-CM

## 2024-07-31 DIAGNOSIS — R06.00 DYSPNEA, UNSPECIFIED: ICD-10-CM

## 2024-07-31 DIAGNOSIS — E78.5 HYPERLIPIDEMIA, UNSPECIFIED: ICD-10-CM

## 2024-08-01 LAB
BASOPHILS # BLD AUTO: 0.03 K/UL
BASOPHILS NFR BLD AUTO: 0.4 %
EOSINOPHIL # BLD AUTO: 0.16 K/UL
EOSINOPHIL NFR BLD AUTO: 2.1 %
ESTIMATED AVERAGE GLUCOSE: 154 MG/DL
HBA1C MFR BLD HPLC: 7 %
HCT VFR BLD CALC: 44 %
HGB BLD-MCNC: 13.8 G/DL
IMM GRANULOCYTES NFR BLD AUTO: 0.1 %
LYMPHOCYTES # BLD AUTO: 1.99 K/UL
LYMPHOCYTES NFR BLD AUTO: 26.4 %
MAN DIFF?: NORMAL
MCHC RBC-ENTMCNC: 28.5 PG
MCHC RBC-ENTMCNC: 31.4 GM/DL
MCV RBC AUTO: 90.9 FL
MONOCYTES # BLD AUTO: 0.65 K/UL
MONOCYTES NFR BLD AUTO: 8.6 %
NEUTROPHILS # BLD AUTO: 4.7 K/UL
NEUTROPHILS NFR BLD AUTO: 62.4 %
PLATELET # BLD AUTO: 176 K/UL
RBC # BLD: 4.84 M/UL
RBC # FLD: 14.2 %
WBC # FLD AUTO: 7.54 K/UL

## 2024-08-01 NOTE — REVIEW OF SYSTEMS
[SOB] : shortness of breath [Dyspnea on exertion] : dyspnea during exertion [Wheezing] : wheezing [Negative] : Heme/Lymph [Chest Discomfort] : no chest discomfort [Lower Ext Edema] : no extremity edema [Leg Claudication] : no intermittent leg claudication [Palpitations] : no palpitations [Orthopnea] : no orthopnea [PND] : no PND [Syncope] : no syncope

## 2024-08-01 NOTE — HISTORY OF PRESENT ILLNESS
[FreeTextEntry1] : [facility] 's ID  Language: Telugu   57 yo M w/ PMH of COPD, hyperlipidemia who presents with acute hypoxic, hypercapnic respiratory failure who was admitted for acute COPD exacerbation 3/23-3/27/2024 presents for follow up. During his hospitalization he required BiPAP and was weaned off with improvement of blood gas. Initially on 6L NC then titrated to room air. Empirically tx for CAP though CXR without consolidation, RVP negative. First hospitalization for respiratory distress/COPD. Patient has followed up with pulm, obtained CT chest c/w emphysema and bronchiectasis Adherent with Symbicort 2 puffs bid, Spiriva respimat 2 puffs once a day, albuterol inh prn. Using duoneb nebulizer tid at home. PFTs are pending.   Current symptoms: severe CARNES, can't even walk 1 minute before he has to stop to rest. Symptoms have improved a little bit since last visit. He is now able to walk ~5 minutes before resting.  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 Left ventricular cavity is small. Left ventricular wall thickness is normal. Left ventricular systolic function is normal with an ejection fraction of 65 % by Butterfield's method of disks. There are no regional wall motion abnormalities seen.  2. Normal right ventricular cavity size, with normal wall thickness, and mildly reduced systolic function.  3. Right ventricular free wall strain is --17 %.  4. Normal left and right atrial size.  5. No significant valvular disease.  6. The inferior vena cava is normal in size measuring 1.40 cm in diameter, (normal <2.1cm) with normal inspiratory collapse (normal >50%) consistent with normal right atrial pressure ( R 3, range 0-5mmHg).  7. No pericardial effusion seen. RAP estimated at 3mmHg, E/A 0.82, E/e' averaged at 7.63, TERRY 18-20s (nml)   6/21/2024 NST 1. Switch to Pharmacologic Protocol: Patient walked on the treadmill for 0:54 minutes and had to stop due to dyspnea and wheezing. Test switched to dobutamine stress test.  2. Normal myocardial perfusion scan, with no evidence of infarction or inducible ischemia.  3. Stress electrocardiogram: No ischemic ST segment changes.  4. The left ventricle is normal in function and small in size. Normal left ventricular diastolic function.  5. Normal right ventricular function, with a right ventricular ejection fraction of greater than 75%. There is no right ventricular dilation. RVEDV 47 mL, RVESV 11 mL.  6. The patient underwent stress testing using pharmacological IV regadenoson (bolus injection, 400mcg over 10 to 20 seconds followed by 5ml flush) protocol. The test was stopped due to completion of protocol. _ The peak heart rate was 113 bpm: 70 % of the patient's predicted maximal heart rate. The heart rate response was normal pharmocologic heart rate response. _ There was a normal pharmacologic blood pressure response with a maximum blood pressure of 148/59 mmHg.  7. Baseline electrocardiogram: NSR with sinus arrhythmia at a rate of 78 bpm with no significant ST abnormalities.  CT chest IMPRESSION: Bronchiectasis in both lower lobes. Emphysema.

## 2024-08-01 NOTE — PHYSICAL EXAM
[Well Developed] : well developed [Well Nourished] : well nourished [No Acute Distress] : no acute distress [Normal Conjunctiva] : normal conjunctiva [Normal Venous Pressure] : normal venous pressure [No Carotid Bruit] : no carotid bruit [Normal S1, S2] : normal S1, S2 [No Murmur] : no murmur [No Rub] : no rub [No Gallop] : no gallop [Soft] : abdomen soft [Non Tender] : non-tender [No Masses/organomegaly] : no masses/organomegaly [Normal Bowel Sounds] : normal bowel sounds [Normal Gait] : normal gait [No Edema] : no edema [No Cyanosis] : no cyanosis [No Rash] : no rash [No Skin Lesions] : no skin lesions [Moves all extremities] : moves all extremities [No Focal Deficits] : no focal deficits [Normal Speech] : normal speech [Alert and Oriented] : alert and oriented [Normal memory] : normal memory [de-identified] : prolonged expiratory phase

## 2024-08-01 NOTE — ASSESSMENT
[FreeTextEntry1] : 59 yo M w/ PMH of COPD, hyperlipidemia who presents with acute hypoxic, hypercapnic respiratory failure who was admitted for acute COPD exacerbation Cardiac workup was initiated in the event his poor exercise tolerance was 2/2 superimposed cardiac etiology.  I have personally reviewed his echocardiogram, and not only is there preserved systolic function, but there is also no indication of impaired diastology. His NST without evidence of infarct or ischemia.  Plan:  - Reassurance and ongoing workup/optimization with pulmonary (pending PFTs) - Repeat labs today (prior BMP with borderline hyperkalemia) - Include A1c and lipid panel for risk stratification - No indication for antihypertensives (Today's BP was 120/80 even though previous clinic visit with trends 130-150 systolic)  RTC in 9months to 1 year with Dr. Angeles

## 2024-08-01 NOTE — END OF VISIT
[FreeTextEntry3] : Evelyn Peterson is a 58-year-old man with history of COPD, hyperlipidemia who initially presented with acute hypoxic, hypercapnic respiratory failure in setting of acute COPD exacerbation. Nuclear stress 6/21/24 showed no evidence of infarction or inducible ischemia. TTE 6/21/24 showed small LV cavity size and wall thickness. Left ventricular systolic function was normal with EF 65. There were no regional wall motion abnormalities. There was normal right ventricular cavity size, with normal wall thickness, and mildly reduced systolic function. Current management includes risk stratification, with including A1c and lipid panel

## 2024-08-01 NOTE — HISTORY OF PRESENT ILLNESS
[FreeTextEntry1] : [facility] 's ID  Language: Ukrainian   57 yo M w/ PMH of COPD, hyperlipidemia who presents with acute hypoxic, hypercapnic respiratory failure who was admitted for acute COPD exacerbation 3/23-3/27/2024 presents for follow up. During his hospitalization he required BiPAP and was weaned off with improvement of blood gas. Initially on 6L NC then titrated to room air. Empirically tx for CAP though CXR without consolidation, RVP negative. First hospitalization for respiratory distress/COPD. Patient has followed up with pulm, obtained CT chest c/w emphysema and bronchiectasis Adherent with Symbicort 2 puffs bid, Spiriva respimat 2 puffs once a day, albuterol inh prn. Using duoneb nebulizer tid at home. PFTs are pending.   Current symptoms: severe CARNES, can't even walk 1 minute before he has to stop to rest. Symptoms have improved a little bit since last visit. He is now able to walk ~5 minutes before resting.  Shx: from [facility], not working currently, worked in agriculture Former smoker - quit 15 years ago, smoked 10 cig/day for 25 years  Current meds: ICS/LABA/LAMA, Lipitor 10, Calcium, Pepcid, vitamin B complex, Singulair  4/2024 proBNP 40  6/21/2024 TTE CONCLUSIONS:  1. Left ventricular cavity is small. Left ventricular wall thickness is normal. Left ventricular systolic function is normal with an ejection fraction of 65 % by Butterfield's method of disks. There are no regional wall motion abnormalities seen.  2. Normal right ventricular cavity size, with normal wall thickness, and mildly reduced systolic function.  3. Right ventricular free wall strain is --17 %.  4. Normal left and right atrial size.  5. No significant valvular disease.  6. The inferior vena cava is normal in size measuring 1.40 cm in diameter, (normal <2.1cm) with normal inspiratory collapse (normal >50%) consistent with normal right atrial pressure ( R 3, range 0-5mmHg).  7. No pericardial effusion seen. RAP estimated at 3mmHg, E/A 0.82, E/e' averaged at 7.63, TERRY 18-20s (nml)   6/21/2024 NST 1. Switch to Pharmacologic Protocol: Patient walked on the treadmill for 0:54 minutes and had to stop due to dyspnea and wheezing. Test switched to dobutamine stress test.  2. Normal myocardial perfusion scan, with no evidence of infarction or inducible ischemia.  3. Stress electrocardiogram: No ischemic ST segment changes.  4. The left ventricle is normal in function and small in size. Normal left ventricular diastolic function.  5. Normal right ventricular function, with a right ventricular ejection fraction of greater than 75%. There is no right ventricular dilation. RVEDV 47 mL, RVESV 11 mL.  6. The patient underwent stress testing using pharmacological IV regadenoson (bolus injection, 400mcg over 10 to 20 seconds followed by 5ml flush) protocol. The test was stopped due to completion of protocol. _ The peak heart rate was 113 bpm: 70 % of the patient's predicted maximal heart rate. The heart rate response was normal pharmocologic heart rate response. _ There was a normal pharmacologic blood pressure response with a maximum blood pressure of 148/59 mmHg.  7. Baseline electrocardiogram: NSR with sinus arrhythmia at a rate of 78 bpm with no significant ST abnormalities.  CT chest IMPRESSION: Bronchiectasis in both lower lobes. Emphysema.

## 2024-08-01 NOTE — PHYSICAL EXAM
[Well Developed] : well developed [Well Nourished] : well nourished [No Acute Distress] : no acute distress [Normal Conjunctiva] : normal conjunctiva [Normal Venous Pressure] : normal venous pressure [No Carotid Bruit] : no carotid bruit [Normal S1, S2] : normal S1, S2 [No Murmur] : no murmur [No Rub] : no rub [No Gallop] : no gallop [Soft] : abdomen soft [Non Tender] : non-tender [No Masses/organomegaly] : no masses/organomegaly [Normal Bowel Sounds] : normal bowel sounds [Normal Gait] : normal gait [No Edema] : no edema [No Cyanosis] : no cyanosis [No Rash] : no rash [No Skin Lesions] : no skin lesions [Moves all extremities] : moves all extremities [No Focal Deficits] : no focal deficits [Normal Speech] : normal speech [Alert and Oriented] : alert and oriented [Normal memory] : normal memory [de-identified] : prolonged expiratory phase

## 2024-08-04 LAB
ALBUMIN SERPL ELPH-MCNC: 4.2 G/DL
ALP BLD-CCNC: 76 U/L
ALT SERPL-CCNC: 23 U/L
ANION GAP SERPL CALC-SCNC: 14 MMOL/L
AST SERPL-CCNC: 34 U/L
BILIRUB SERPL-MCNC: 0.5 MG/DL
BUN SERPL-MCNC: 12 MG/DL
CALCIUM SERPL-MCNC: 9.5 MG/DL
CHLORIDE SERPL-SCNC: 101 MMOL/L
CHOLEST SERPL-MCNC: 154 MG/DL
CO2 SERPL-SCNC: 25 MMOL/L
CREAT SERPL-MCNC: 0.75 MG/DL
EGFR: 105 ML/MIN/1.73M2
GLUCOSE SERPL-MCNC: 80 MG/DL
HDLC SERPL-MCNC: 58 MG/DL
LDLC SERPL CALC-MCNC: 79 MG/DL
NONHDLC SERPL-MCNC: 96 MG/DL
POTASSIUM SERPL-SCNC: 4 MMOL/L
PROT SERPL-MCNC: 7.6 G/DL
SODIUM SERPL-SCNC: 140 MMOL/L
TRIGL SERPL-MCNC: 89 MG/DL